# Patient Record
Sex: MALE | Race: BLACK OR AFRICAN AMERICAN | NOT HISPANIC OR LATINO | URBAN - METROPOLITAN AREA
[De-identification: names, ages, dates, MRNs, and addresses within clinical notes are randomized per-mention and may not be internally consistent; named-entity substitution may affect disease eponyms.]

---

## 2023-09-22 PROBLEM — E78.5 HYPERLIPIDEMIA: Status: ACTIVE | Noted: 2023-09-22

## 2023-09-22 PROBLEM — H53.8 HAZY VISION: Status: ACTIVE | Noted: 2023-09-22

## 2023-09-22 PROBLEM — N40.1 BPH ASSOCIATED WITH NOCTURIA: Status: ACTIVE | Noted: 2023-09-22

## 2023-09-22 PROBLEM — I10 HYPERTENSION: Status: ACTIVE | Noted: 2023-09-22

## 2023-09-22 PROBLEM — E11.9 TYPE 2 DIABETES MELLITUS WITHOUT COMPLICATION, WITHOUT LONG-TERM CURRENT USE OF INSULIN (MULTI): Status: ACTIVE | Noted: 2023-09-22

## 2023-09-22 PROBLEM — R35.1 BPH ASSOCIATED WITH NOCTURIA: Status: ACTIVE | Noted: 2023-09-22

## 2023-09-22 PROBLEM — K64.4 EXTERNAL HEMORRHOIDS WITHOUT COMPLICATION: Status: ACTIVE | Noted: 2023-09-22

## 2023-09-22 PROBLEM — K64.9 HEMORRHOIDS: Status: ACTIVE | Noted: 2023-09-22

## 2023-09-22 PROBLEM — T14.8XXA MUSCLE STRAIN: Status: ACTIVE | Noted: 2023-09-22

## 2023-09-22 PROBLEM — N52.9 ERECTILE DYSFUNCTION: Status: ACTIVE | Noted: 2023-09-22

## 2023-09-22 PROBLEM — H10.9 CONJUNCTIVITIS: Status: ACTIVE | Noted: 2023-09-22

## 2023-09-22 PROBLEM — N40.0 PROSTATE ENLARGEMENT: Status: ACTIVE | Noted: 2023-09-22

## 2023-09-22 PROBLEM — M51.26 LUMBAR DISC HERNIATION: Status: ACTIVE | Noted: 2023-09-22

## 2023-09-22 PROBLEM — R30.0 DYSURIA: Status: ACTIVE | Noted: 2023-09-22

## 2023-09-22 PROBLEM — R33.9 URINARY RETENTION: Status: ACTIVE | Noted: 2023-09-22

## 2023-09-22 PROBLEM — N32.89 BLADDER SPASM: Status: ACTIVE | Noted: 2023-09-22

## 2023-09-22 RX ORDER — TAMSULOSIN HYDROCHLORIDE 0.4 MG/1
2 CAPSULE ORAL DAILY
COMMUNITY
Start: 2021-06-14 | End: 2023-11-20 | Stop reason: HOSPADM

## 2023-09-22 RX ORDER — TADALAFIL 20 MG/1
1 TABLET ORAL AS NEEDED
COMMUNITY
Start: 2020-07-31 | End: 2023-10-23

## 2023-09-22 RX ORDER — PHENAZOPYRIDINE HYDROCHLORIDE 200 MG/1
1 TABLET, FILM COATED ORAL
COMMUNITY
End: 2023-10-05

## 2023-09-22 RX ORDER — METFORMIN HYDROCHLORIDE 500 MG/1
1 TABLET ORAL
COMMUNITY
Start: 2020-08-21 | End: 2023-10-23

## 2023-09-22 RX ORDER — OMEPRAZOLE 20 MG/1
1 CAPSULE, DELAYED RELEASE ORAL
COMMUNITY
Start: 2023-08-08 | End: 2023-11-13

## 2023-09-22 RX ORDER — ATORVASTATIN CALCIUM 10 MG/1
1 TABLET, FILM COATED ORAL DAILY
COMMUNITY
Start: 2022-10-04

## 2023-09-22 RX ORDER — LEVOFLOXACIN 250 MG/1
1 TABLET ORAL DAILY
COMMUNITY
End: 2023-11-13

## 2023-10-05 ENCOUNTER — HOSPITAL ENCOUNTER (EMERGENCY)
Facility: HOSPITAL | Age: 63
Discharge: HOME | End: 2023-10-05
Payer: COMMERCIAL

## 2023-10-05 VITALS
TEMPERATURE: 97.7 F | HEART RATE: 75 BPM | DIASTOLIC BLOOD PRESSURE: 83 MMHG | BODY MASS INDEX: 32.83 KG/M2 | RESPIRATION RATE: 16 BRPM | HEIGHT: 75 IN | WEIGHT: 264 LBS | SYSTOLIC BLOOD PRESSURE: 146 MMHG | OXYGEN SATURATION: 98 %

## 2023-10-05 DIAGNOSIS — R30.0 DYSURIA: Primary | ICD-10-CM

## 2023-10-05 LAB
ANION GAP SERPL CALC-SCNC: 10 MMOL/L (ref 10–20)
APPEARANCE UR: CLEAR
BASOPHILS # BLD AUTO: 0.02 X10*3/UL (ref 0–0.1)
BASOPHILS NFR BLD AUTO: 0.3 %
BILIRUB UR STRIP.AUTO-MCNC: NEGATIVE MG/DL
BUN SERPL-MCNC: 11 MG/DL (ref 6–23)
CALCIUM SERPL-MCNC: 9.3 MG/DL (ref 8.6–10.3)
CHLORIDE SERPL-SCNC: 104 MMOL/L (ref 98–107)
CO2 SERPL-SCNC: 25 MMOL/L (ref 21–32)
COLOR UR: NORMAL
CREAT SERPL-MCNC: 1.07 MG/DL (ref 0.5–1.3)
EOSINOPHIL # BLD AUTO: 0.02 X10*3/UL (ref 0–0.7)
EOSINOPHIL NFR BLD AUTO: 0.3 %
ERYTHROCYTE [DISTWIDTH] IN BLOOD BY AUTOMATED COUNT: 12.8 % (ref 11.5–14.5)
GFR SERPL CREATININE-BSD FRML MDRD: 79 ML/MIN/1.73M*2
GLUCOSE SERPL-MCNC: 153 MG/DL (ref 74–99)
GLUCOSE UR STRIP.AUTO-MCNC: NEGATIVE MG/DL
HCT VFR BLD AUTO: 47 % (ref 41–52)
HGB BLD-MCNC: 15.4 G/DL (ref 13.5–17.5)
IMM GRANULOCYTES # BLD AUTO: 0.02 X10*3/UL (ref 0–0.7)
IMM GRANULOCYTES NFR BLD AUTO: 0.3 % (ref 0–0.9)
KETONES UR STRIP.AUTO-MCNC: NEGATIVE MG/DL
LEUKOCYTE ESTERASE UR QL STRIP.AUTO: NEGATIVE
LYMPHOCYTES # BLD AUTO: 1.56 X10*3/UL (ref 1.2–4.8)
LYMPHOCYTES NFR BLD AUTO: 21.8 %
MCH RBC QN AUTO: 28.5 PG (ref 26–34)
MCHC RBC AUTO-ENTMCNC: 32.8 G/DL (ref 32–36)
MCV RBC AUTO: 87 FL (ref 80–100)
MONOCYTES # BLD AUTO: 0.48 X10*3/UL (ref 0.1–1)
MONOCYTES NFR BLD AUTO: 6.7 %
NEUTROPHILS # BLD AUTO: 5.05 X10*3/UL (ref 1.2–7.7)
NEUTROPHILS NFR BLD AUTO: 70.6 %
NITRITE UR QL STRIP.AUTO: NEGATIVE
NRBC BLD-RTO: 0 /100 WBCS (ref 0–0)
PH UR STRIP.AUTO: 6 [PH]
PLATELET # BLD AUTO: 194 X10*3/UL (ref 150–450)
PMV BLD AUTO: 10.8 FL (ref 7.5–11.5)
POTASSIUM SERPL-SCNC: 4.2 MMOL/L (ref 3.5–5.3)
POTASSIUM SERPL-SCNC: 6.1 MMOL/L (ref 3.5–5.3)
PROT UR STRIP.AUTO-MCNC: NEGATIVE MG/DL
RBC # BLD AUTO: 5.41 X10*6/UL (ref 4.5–5.9)
RBC # UR STRIP.AUTO: NEGATIVE /UL
SODIUM SERPL-SCNC: 133 MMOL/L (ref 136–145)
SP GR UR STRIP.AUTO: 1
UROBILINOGEN UR STRIP.AUTO-MCNC: <2 MG/DL
WBC # BLD AUTO: 7.2 X10*3/UL (ref 4.4–11.3)

## 2023-10-05 PROCEDURE — 36415 COLL VENOUS BLD VENIPUNCTURE: CPT | Performed by: PHYSICIAN ASSISTANT

## 2023-10-05 PROCEDURE — 84132 ASSAY OF SERUM POTASSIUM: CPT | Performed by: PHYSICIAN ASSISTANT

## 2023-10-05 PROCEDURE — 85025 COMPLETE CBC W/AUTO DIFF WBC: CPT | Performed by: PHYSICIAN ASSISTANT

## 2023-10-05 PROCEDURE — 84132 ASSAY OF SERUM POTASSIUM: CPT | Mod: 59 | Performed by: PHYSICIAN ASSISTANT

## 2023-10-05 PROCEDURE — 81003 URINALYSIS AUTO W/O SCOPE: CPT | Performed by: PHYSICIAN ASSISTANT

## 2023-10-05 PROCEDURE — 99283 EMERGENCY DEPT VISIT LOW MDM: CPT

## 2023-10-05 PROCEDURE — 99284 EMERGENCY DEPT VISIT MOD MDM: CPT | Performed by: PHYSICIAN ASSISTANT

## 2023-10-05 PROCEDURE — 99284 EMERGENCY DEPT VISIT MOD MDM: CPT

## 2023-10-05 RX ORDER — PHENAZOPYRIDINE HYDROCHLORIDE 200 MG/1
200 TABLET, FILM COATED ORAL 3 TIMES DAILY PRN
Qty: 6 TABLET | Refills: 0 | Status: SHIPPED | OUTPATIENT
Start: 2023-10-05 | End: 2023-10-07

## 2023-10-05 ASSESSMENT — COLUMBIA-SUICIDE SEVERITY RATING SCALE - C-SSRS
6. HAVE YOU EVER DONE ANYTHING, STARTED TO DO ANYTHING, OR PREPARED TO DO ANYTHING TO END YOUR LIFE?: NO
1. IN THE PAST MONTH, HAVE YOU WISHED YOU WERE DEAD OR WISHED YOU COULD GO TO SLEEP AND NOT WAKE UP?: NO
6. HAVE YOU EVER DONE ANYTHING, STARTED TO DO ANYTHING, OR PREPARED TO DO ANYTHING TO END YOUR LIFE?: NO
1. IN THE PAST MONTH, HAVE YOU WISHED YOU WERE DEAD OR WISHED YOU COULD GO TO SLEEP AND NOT WAKE UP?: NO
2. HAVE YOU ACTUALLY HAD ANY THOUGHTS OF KILLING YOURSELF?: NO
2. HAVE YOU ACTUALLY HAD ANY THOUGHTS OF KILLING YOURSELF?: NO

## 2023-10-05 ASSESSMENT — LIFESTYLE VARIABLES
HAVE YOU EVER FELT YOU SHOULD CUT DOWN ON YOUR DRINKING: NO
EVER HAD A DRINK FIRST THING IN THE MORNING TO STEADY YOUR NERVES TO GET RID OF A HANGOVER: NO
HAVE PEOPLE ANNOYED YOU BY CRITICIZING YOUR DRINKING: NO
EVER FELT BAD OR GUILTY ABOUT YOUR DRINKING: NO

## 2023-10-05 ASSESSMENT — PAIN SCALES - GENERAL: PAINLEVEL_OUTOF10: 8

## 2023-10-05 ASSESSMENT — PAIN DESCRIPTION - PAIN TYPE: TYPE: ACUTE PAIN

## 2023-10-05 ASSESSMENT — PAIN DESCRIPTION - FREQUENCY: FREQUENCY: INTERMITTENT

## 2023-10-05 ASSESSMENT — PAIN DESCRIPTION - ONSET: ONSET: ONGOING

## 2023-10-05 ASSESSMENT — PAIN DESCRIPTION - LOCATION: LOCATION: PENIS

## 2023-10-05 ASSESSMENT — PAIN - FUNCTIONAL ASSESSMENT: PAIN_FUNCTIONAL_ASSESSMENT: 0-10

## 2023-10-05 ASSESSMENT — PAIN DESCRIPTION - DESCRIPTORS: DESCRIPTORS: ACHING

## 2023-10-05 NOTE — ED NOTES
Pt ambulated to restroom but urinated on self before he was able to make it to restroom.      Cher Enamorado RN  10/05/23 1809

## 2023-10-05 NOTE — ED NOTES
Pt presents to ED for urinary complications. Pt stated he had a catheter 2-3 months ago for retention. This morning, pt stated when he went to urinate, only a couple drops came out. Pt thinks he is retaining and having bladder spasms. Pt has hx inflamed prostate. Pt denies ABD/Flank pain.      Cher Enamorado RN  10/05/23 8235

## 2023-10-05 NOTE — ED PROVIDER NOTES
"HPI   Chief Complaint   Patient presents with    Urinary Retention     Pt has only been able to empty few drops when urinating, pain when urinating, pt has had to have a catheter in the past       Patient is a 62-year-old male with history of hyperlipidemia, diabetes and enlarged prostate who presents today for evaluation of urinary retention, patient states that for started when he could not urinate in May, he ended up going to the emergency room where he was not able to urinate at all, he had a urinary catheter placed at that time, it has since been removed and patient has not had any problems with urination since May.  He has been following with a urologist, actually has an appointment with Dr. Isi Fulton on the 20th of this month.  He states that when he woke up this morning he went to urinate and states he had difficulty starting a urinary stream, he was eventually able to urinate but states that it came out in spurts and it burned on the way out.  Patient is never had a urinary tract infection but they told him at the urologist office that is a possibility.  He was able to provide a urine sample here today and has urinated once even after I saw him.  He states he does not have any discomfort when he is not urinating but just feels like he urinates in \"spurts\" and states that there is burning with urination.  He denies any abdominal pain or back pain, denies any penile discharge, denies any possibility of STDs.  Denies any history of previous UTIs.  He is feeling better today currently than he did this morning.  He is on Flomax and did take it this morning and takes 2/day as prescribed.                            [place] Coma Scale Score: 15                  Patient History   History reviewed. No pertinent past medical history.  History reviewed. No pertinent surgical history.  No family history on file.  Social History     Tobacco Use    Smoking status: Former     Types: Cigarettes    Smokeless tobacco: Former "   Vaping Use    Vaping Use: Not on file   Substance Use Topics    Alcohol use: Never    Drug use: Never       Physical Exam   ED Triage Vitals [10/05/23 1209]   Temp Heart Rate Resp BP   36.5 °C (97.7 °F) 101 18 (!) 174/92      SpO2 Temp Source Heart Rate Source Patient Position   95 % Temporal Monitor Sitting      BP Location FiO2 (%)     Left arm --       Physical Exam  Vitals and nursing note reviewed.   Constitutional:       General: He is not in acute distress.     Appearance: Normal appearance. He is not toxic-appearing.   HENT:      Head: Normocephalic and atraumatic.      Nose: Nose normal.   Eyes:      Extraocular Movements: Extraocular movements intact.   Cardiovascular:      Rate and Rhythm: Normal rate and regular rhythm.   Pulmonary:      Effort: Pulmonary effort is normal.   Abdominal:      General: There is no distension.      Palpations: Abdomen is soft.      Tenderness: There is no abdominal tenderness. There is no guarding.   Musculoskeletal:         General: Normal range of motion.      Cervical back: Normal range of motion and neck supple.   Skin:     General: Skin is warm and dry.   Neurological:      General: No focal deficit present.      Mental Status: He is alert.   Psychiatric:         Mood and Affect: Mood normal.         Thought Content: Thought content normal.       No orders to display     Labs Reviewed   BASIC METABOLIC PANEL - Abnormal       Result Value    Glucose 153 (*)     Sodium 133 (*)     Potassium 6.1 (*)     Chloride 104      Bicarbonate 25      Anion Gap 10      Urea Nitrogen 11      Creatinine 1.07      eGFR 79      Calcium 9.3     URINALYSIS WITH REFLEX MICROSCOPIC AND CULTURE - Normal    Color, Urine Straw      Appearance, Urine Clear      Specific Gravity, Urine 1.005      pH, Urine 6.0      Protein, Urine NEGATIVE      Glucose, Urine NEGATIVE      Blood, Urine NEGATIVE      Ketones, Urine NEGATIVE      Bilirubin, Urine NEGATIVE      Urobilinogen, Urine <2.0       Nitrite, Urine NEGATIVE      Leukocyte Esterase, Urine NEGATIVE     POTASSIUM - Normal    Potassium 4.2     CBC WITH AUTO DIFFERENTIAL    WBC 7.2      nRBC 0.0      RBC 5.41      Hemoglobin 15.4      Hematocrit 47.0      MCV 87      MCH 28.5      MCHC 32.8      RDW 12.8      Platelets 194      MPV 10.8      Neutrophils % 70.6      Immature Granulocytes %, Automated 0.3      Lymphocytes % 21.8      Monocytes % 6.7      Eosinophils % 0.3      Basophils % 0.3      Neutrophils Absolute 5.05      Immature Granulocytes Absolute, Automated 0.02      Lymphocytes Absolute 1.56      Monocytes Absolute 0.48      Eosinophils Absolute 0.02      Basophils Absolute 0.02     URINALYSIS WITH REFLEX MICROSCOPIC AND CULTURE    Narrative:     The following orders were created for panel order Urinalysis with Reflex Microscopic and Culture.  Procedure                               Abnormality         Status                     ---------                               -----------         ------                     Urinalysis with Reflex M...[[id number]]  Normal              Final result               Extra Urine Gray Tube[[id number]]                            In process                   Please view results for these tests on the individual orders.   EXTRA URINE GRAY TUBE         ED Course & MDM   Diagnoses as of 10/05/23 1812   Dysuria       Medical Decision Making    MDM: Patient is a 62-year-old male who presents today for urinary retention that started this morning however it has since improved, he was able to provide a urine sample and empty his bladder, we did BladderScan him after he urinated, urinalysis, CBC and BMP were obtained to evaluate for renal function or urinary tract infection.  Plan to discharge with follow-up with urology, do not feel that an indwelling catheter is appropriate at this point given that patient is able to empty his bladder and is not actually retaining and the potential risk of infection outweigh any  benefits which I discussed with the patient and he agrees and is agreeable with the plan.  Patient had a completely unremarkable urinalysis, CBC normal, his initial BMP showed a 6.1 potassium, likely hemolyzed, we did redraw this and potassium came back at 4.2.  Patient has gradually been improving while being in the ED, he has had multiple voids while in the ED, bladder scan revealed 288 mL in the bladder.  I discussed with patient, I did offer placing a Red catheter however this would include potential risks of catheter associated UTI and there may be more harm than benefit given that he is actually able to void and does not have full urinary retention.  Patient agrees, would not like a catheter at this time, he does have follow-up with urology in 15 days, advised to call if he can get in any sooner, he is advised to return to the ER with any new or worsening symptoms including total obstruction where he is unable to urinate.  We will give him Pyridium for symptom control in the meantime.        Procedure  Procedures     Susan Sy PA-C  10/05/23 1812

## 2023-10-18 DIAGNOSIS — Z29.9 PROPHYLACTIC MEASURE: ICD-10-CM

## 2023-10-18 RX ORDER — PHENAZOPYRIDINE HYDROCHLORIDE 200 MG/1
200 TABLET, FILM COATED ORAL 3 TIMES DAILY
Qty: 10 TABLET | Refills: 0 | Status: SHIPPED | OUTPATIENT
Start: 2023-10-18 | End: 2023-10-28

## 2023-10-23 ENCOUNTER — PROCEDURE VISIT (OUTPATIENT)
Dept: UROLOGY | Facility: HOSPITAL | Age: 63
End: 2023-10-23
Payer: COMMERCIAL

## 2023-10-23 DIAGNOSIS — R33.9 URINARY RETENTION: Primary | ICD-10-CM

## 2023-10-23 DIAGNOSIS — R35.1 BPH ASSOCIATED WITH NOCTURIA: ICD-10-CM

## 2023-10-23 DIAGNOSIS — N40.1 BPH ASSOCIATED WITH NOCTURIA: ICD-10-CM

## 2023-10-23 LAB
POC APPEARANCE, URINE: CLEAR
POC BILIRUBIN, URINE: NEGATIVE
POC BLOOD, URINE: NEGATIVE
POC COLOR, URINE: YELLOW
POC GLUCOSE, URINE: ABNORMAL MG/DL
POC KETONES, URINE: NEGATIVE MG/DL
POC LEUKOCYTES, URINE: NEGATIVE
POC NITRITE,URINE: POSITIVE
POC PH, URINE: 6.5 PH
POC PROTEIN, URINE: ABNORMAL MG/DL
POC SPECIFIC GRAVITY, URINE: 1.02
POC UROBILINOGEN, URINE: 2 EU/DL

## 2023-10-23 PROCEDURE — 76872 US TRANSRECTAL: CPT | Performed by: UROLOGY

## 2023-10-23 PROCEDURE — 99214 OFFICE O/P EST MOD 30 MIN: CPT | Performed by: UROLOGY

## 2023-10-23 PROCEDURE — 81003 URINALYSIS AUTO W/O SCOPE: CPT | Mod: QW | Performed by: UROLOGY

## 2023-10-23 PROCEDURE — 51741 ELECTRO-UROFLOWMETRY FIRST: CPT | Performed by: UROLOGY

## 2023-10-23 PROCEDURE — 52000 CYSTOURETHROSCOPY: CPT | Performed by: UROLOGY

## 2023-10-23 RX ORDER — CIPROFLOXACIN 500 MG/1
500 TABLET ORAL 2 TIMES DAILY
Qty: 14 TABLET | Refills: 0 | Status: SHIPPED | OUTPATIENT
Start: 2023-10-23 | End: 2023-10-30

## 2023-10-23 ASSESSMENT — PAIN SCALES - GENERAL: PAINLEVEL: 0-NO PAIN

## 2023-10-23 NOTE — PROGRESS NOTES
"FUV    Last seen - 7/3/23     HISTORY OF PRESENT ILLNESS:   Robby Leon is a 62 y.o. male who is being seen today for cysto.trus    Pt with h/o urinary retention.  Been on flomax BID.      PAST MEDICAL HISTORY:  No past medical history on file.    PAST SURGICAL HISTORY:  No past surgical history on file.     ALLERGIES:   No Known Allergies     MEDICATIONS:   Current Outpatient Medications   Medication Instructions    atorvastatin (Lipitor) 10 mg tablet 1 tablet, oral, Daily    levoFLOXacin (Levaquin) 250 mg tablet 1 tablet, oral, Daily    metFORMIN (Glucophage) 500 mg tablet 1 tablet, oral, Daily with evening meal    omeprazole (PriLOSEC) 20 mg DR capsule 1 capsule, oral, Daily before breakfast    phenazopyridine (PYRIDIUM) 200 mg, oral, 3 times daily    tadalafil 20 mg tablet 1 tablet, oral, As needed    tamsulosin (Flomax) 0.4 mg 24 hr capsule 2 capsules, oral, Daily        PHYSICAL EXAM:  There were no vitals taken for this visit.  Constitutional: Patient appears well-developed and well-nourished. No distress.    Pulmonary/Chest: Effort normal. No respiratory distress.   Abdominal: Soft, ND NT  Musculoskeletal: Normal range of motion.    Neurological: Alert and oriented to person, place, and time.  Psychiatric: Normal mood and affect. Behavior is normal. Thought content normal.      Labs  Lab Results   Component Value Date    TESTOSTERONE 479 08/03/2020       Lab Results   Component Value Date    PSA 4.9 (H) 08/03/2020     Lab Results   Component Value Date    CREATININE 1.07 10/05/2023     Lab Results   Component Value Date    CHOL 262 (H) 10/04/2022     Lab Results   Component Value Date    HDL 41 10/04/2022     Lab Results   Component Value Date    CHHDL 6.4 10/04/2022     No results found for: \"LDLF\"  No results found for: \"VLDL\"  Lab Results   Component Value Date    TRIG 178 (H) 10/04/2022     Lab Results   Component Value Date    HGBA1C 6.7 (H) 08/03/2020     Lab Results   Component Value Date    HCT " 47.0 10/05/2023     Procedures  Procedure:  After informed consent was obtained, the patient was taken to the procedure room for cystoscopy due to LUTS    Cystoscopy     Procedure Note:    A sterile prep and drape was performed in standard fashion. Lidocaine was used for topical anesthesia. A flexible cystoscope was inserted into the urethra without difficulty revealing normal urethra.     The prostate trilobar enlargement with intravesicular median lobe    Then entered the bladder revealing bladder mucosa with no erythematous patches or plaques, foreign bodies, stones or papillary lesions. The ureteral orifices were visualized bilaterally. These were orthotopic in location and effluxing clear urine. No masses were seen on retroflexion.     Post-Procedure:   The cystoscope was removed. The vital signs were stable . The patient tolerated the procedure well. There were no complications.      Transrectal US    After informed consent was obtained, the patient was taken to the procedure room for transrectal US on the prostate.    The patient was placed in the left lateral decubitus position with his knees flexed and hips on the edge of the table.  The ultrasound probe was lubricated and inserted into the rectum.    A scanning ultrasound was performed.  There were no suspicious lesions or hypoechoic areas.      Measurements were taken  Width - 55.69 mm  Height - 45.8 mm  Length - 76.1 mm    Calculated Volume  - 101 cc    The ultrasound probe was removed without incident.  Patient tolerated the procedure well.     Complex Uroflow  Max flow: 10.8 ml/s  Avg flow: 4.9 ml/s  Voiding time: 27.4 s  Flow time: 27.3 s  Time to max flow: 4.2 s  Voided volume: 133 ml  Flow pattern: bell    PVR by bladder scan: 1cc      Assessment:      1. Urinary retention  POCT UA Automated manually resulted      2. BPH associated with nocturia          Cystoscopy showed obstructive prostate with intravesicular median lobe.  Prostate volume 101cc.   Qmax 10.8ml/s.  PVR 1cc.     Plan:   -Cont flomax  -Conservative voiding measures  -Will check PSA in few weeks  -Cipro for presumed UTI, will send for Cx  -FU with Dr. Glover to discuss HOLEP

## 2023-11-01 ENCOUNTER — OFFICE VISIT (OUTPATIENT)
Dept: UROLOGY | Facility: HOSPITAL | Age: 63
End: 2023-11-01
Payer: COMMERCIAL

## 2023-11-01 DIAGNOSIS — N40.1 ENLARGED PROSTATE WITH URINARY RETENTION: Primary | ICD-10-CM

## 2023-11-01 DIAGNOSIS — R33.8 ENLARGED PROSTATE WITH URINARY RETENTION: Primary | ICD-10-CM

## 2023-11-01 PROCEDURE — 3079F DIAST BP 80-89 MM HG: CPT | Performed by: UROLOGY

## 2023-11-01 PROCEDURE — 99214 OFFICE O/P EST MOD 30 MIN: CPT | Performed by: UROLOGY

## 2023-11-01 PROCEDURE — 3075F SYST BP GE 130 - 139MM HG: CPT | Performed by: UROLOGY

## 2023-11-01 PROCEDURE — 1036F TOBACCO NON-USER: CPT | Performed by: UROLOGY

## 2023-11-01 NOTE — PROGRESS NOTES
HPI  The patient is a 62 y.o. male presenting on 11/01/2023 with a hx of BPH with urinary retention, referred by Dr. Fulton for an outlet discussion. TRUS 10/23/23 showed obstructive prostate with intravesicular median lobe. Prostate volume 101cc. PVR 1cc.    He is interested in an outlet procedure at this time. Still with a very weak stream.    Lab Results   Component Value Date    PSA 4.9 (H) 08/03/2020       Current Medications:  Current Outpatient Medications   Medication Sig Dispense Refill    atorvastatin (Lipitor) 10 mg tablet Take 1 tablet (10 mg) by mouth once daily.      levoFLOXacin (Levaquin) 250 mg tablet Take 1 tablet (250 mg) by mouth once daily.      omeprazole (PriLOSEC) 20 mg DR capsule Take 1 capsule (20 mg) by mouth once daily in the morning. Take before meals.      tamsulosin (Flomax) 0.4 mg 24 hr capsule Take 2 capsules (0.8 mg) by mouth once daily.       No current facility-administered medications for this visit.        Active Problems:  Robby Leon is a 62 y.o. male with the following Problems and Medications.  Patient Active Problem List   Diagnosis    Conjunctivitis    Dysuria    Erectile dysfunction    External hemorrhoids without complication    Hazy vision    Hemorrhoids    Hyperlipidemia    Hypertension    Lumbar disc herniation    Muscle strain    Prostate enlargement    Type 2 diabetes mellitus without complication, without long-term current use of insulin (CMS/[facility])    Urinary retention    Bladder spasm    BPH associated with nocturia     Current Outpatient Medications   Medication Sig Dispense Refill    atorvastatin (Lipitor) 10 mg tablet Take 1 tablet (10 mg) by mouth once daily.      levoFLOXacin (Levaquin) 250 mg tablet Take 1 tablet (250 mg) by mouth once daily.      omeprazole (PriLOSEC) 20 mg DR capsule Take 1 capsule (20 mg) by mouth once daily in the morning. Take before meals.      tamsulosin (Flomax) 0.4 mg 24 hr capsule Take 2 capsules (0.8 mg) by mouth once daily.        No current facility-administered medications for this visit.       PMH:  No past medical history on file.    PSH:  No past surgical history on file.    FMH:  No family history on file.    SHx:  Social History     Tobacco Use    Smoking status: Former     Types: Cigarettes    Smokeless tobacco: Former   Substance Use Topics    Alcohol use: Never    Drug use: Never       Allergies:  No Known Allergies      Assesment/Plan  We discussed outlet procedures for his large, obstructing prostate. He is very interested in an outlet procedure at this time.    We had a long and thorough discussion regarding the natural history and options for treatment for bothersome prostatic enlargement.  We discussed that observation is an option for minimally symptomatic BPH and the role of medical therapy, but surgical management is recommended for bothersome symptoms despite appropriate medical therapy, when a patient desires to avoid medications, severe or recurrent urinary tract infections, recurrent hematuria attributed to prostatic bleeding, urinary retention, or concern for upper tract damage caused by high pressure voiding and/or incomplete bladder emptying.     We discussed minimally invasive surgical therapies (MIST) including Rezum and UroLift along with their specific indications, risks, and benefits.  Given the size of his prostate, these would not be appropriate for him.  We discussed surgical options including transurethral resection of prostate (TURP), greenlight PVP, HoLEP, open simple prostatectomy, and robotic simple prostatectomy.  Again, given the size of his gland he would benefit from an enucleative approach.  We discussed the relative merits of robotic simple prostatectomy, open simple prostatectomy, and holmium laser enucleation of the prostate.  In particular, given the minimally invasive approach with associated short duration of catheter, low complication rate, and possibility for an outpatient or overnight  stay, I recommended he consider a HoLEP.     We had a long discussion about holmium laser enucleation of the prostate.  I explained how the procedure is done and aundrea diagrams.  I discussed the perioperative pathway, likely 1 night with a catheter and either outpatient or overnight observation in the hospital.  Discussed risks including acute and delayed bleeding, infection, risk of incontinence, risk of anejaculation.  In particular, regarding the risk of temporary incontinence we discussed the literature that reports approximately a third of men at 3 months will have some degree of bothersome leakage, but that number drops to less than 1% at 1 year.  Discussed the low probability of blood transfusion.  I discussed that following the procedure he would not be able to ejaculate, but would still obtain erection and orgasm at his current sexual function. We discussed the possibility of repeat operation, though uncommon with HoLEP.     The patient understands all r/b/a and wishes to proceed with HoLEP on 11/20/23.    Scribe Attestation  By signing my name below, I, Radha Gaines , Scribe   attest that this documentation has been prepared under the direction and in the presence of Omar Glover MD.

## 2023-11-01 NOTE — LETTER
November 1, 2023     Patient: Robby Leon   YOB: 1960   Date of Visit: 11/1/2023       To Whom It May Concern:    It is my medical opinion that Robby Leon should remain out of work until 11/27/2023 due to having surgery on 11/20/2023 .     If you have any questions or concerns, please don't hesitate to call.         Sincerely,        Omar Glover MD    CC: No Recipients

## 2023-11-13 ENCOUNTER — TELEMEDICINE CLINICAL SUPPORT (OUTPATIENT)
Dept: PREADMISSION TESTING | Facility: HOSPITAL | Age: 63
End: 2023-11-13
Payer: COMMERCIAL

## 2023-11-13 RX ORDER — OMEPRAZOLE 20 MG/1
20 CAPSULE, DELAYED RELEASE ORAL
COMMUNITY

## 2023-11-16 ENCOUNTER — LAB (OUTPATIENT)
Dept: LAB | Facility: LAB | Age: 63
End: 2023-11-16
Payer: COMMERCIAL

## 2023-11-16 ENCOUNTER — PRE-ADMISSION TESTING (OUTPATIENT)
Dept: PREADMISSION TESTING | Facility: HOSPITAL | Age: 63
End: 2023-11-16
Payer: COMMERCIAL

## 2023-11-16 VITALS
WEIGHT: 269.84 LBS | RESPIRATION RATE: 18 BRPM | DIASTOLIC BLOOD PRESSURE: 73 MMHG | HEIGHT: 73 IN | SYSTOLIC BLOOD PRESSURE: 123 MMHG | TEMPERATURE: 94.5 F | HEART RATE: 58 BPM | OXYGEN SATURATION: 95 % | BODY MASS INDEX: 35.76 KG/M2

## 2023-11-16 DIAGNOSIS — R33.8 ENLARGED PROSTATE WITH URINARY RETENTION: ICD-10-CM

## 2023-11-16 DIAGNOSIS — I10 PRIMARY HYPERTENSION: ICD-10-CM

## 2023-11-16 DIAGNOSIS — R33.9 URINARY RETENTION: ICD-10-CM

## 2023-11-16 DIAGNOSIS — N40.1 BPH ASSOCIATED WITH NOCTURIA: ICD-10-CM

## 2023-11-16 DIAGNOSIS — I10 PRIMARY HYPERTENSION: Primary | ICD-10-CM

## 2023-11-16 DIAGNOSIS — N40.1 ENLARGED PROSTATE WITH URINARY RETENTION: ICD-10-CM

## 2023-11-16 DIAGNOSIS — R35.1 BPH ASSOCIATED WITH NOCTURIA: ICD-10-CM

## 2023-11-16 LAB
ABO GROUP (TYPE) IN BLOOD: NORMAL
ANION GAP SERPL CALC-SCNC: 12 MMOL/L (ref 10–20)
ANTIBODY SCREEN: NORMAL
APPEARANCE UR: CLEAR
BASOPHILS # BLD AUTO: 0.04 X10*3/UL (ref 0–0.1)
BASOPHILS NFR BLD AUTO: 0.8 %
BILIRUB UR STRIP.AUTO-MCNC: NEGATIVE MG/DL
BUN SERPL-MCNC: 18 MG/DL (ref 6–23)
CALCIUM SERPL-MCNC: 8.9 MG/DL (ref 8.6–10.3)
CHLORIDE SERPL-SCNC: 106 MMOL/L (ref 98–107)
CO2 SERPL-SCNC: 25 MMOL/L (ref 21–32)
COLOR UR: YELLOW
CREAT SERPL-MCNC: 1.15 MG/DL (ref 0.5–1.3)
EOSINOPHIL # BLD AUTO: 0.06 X10*3/UL (ref 0–0.7)
EOSINOPHIL NFR BLD AUTO: 1.2 %
ERYTHROCYTE [DISTWIDTH] IN BLOOD BY AUTOMATED COUNT: 12.8 % (ref 11.5–14.5)
GFR SERPL CREATININE-BSD FRML MDRD: 73 ML/MIN/1.73M*2
GLUCOSE SERPL-MCNC: 124 MG/DL (ref 74–99)
GLUCOSE UR STRIP.AUTO-MCNC: NEGATIVE MG/DL
HCT VFR BLD AUTO: 47.1 % (ref 41–52)
HGB BLD-MCNC: 15.3 G/DL (ref 13.5–17.5)
IMM GRANULOCYTES # BLD AUTO: 0.02 X10*3/UL (ref 0–0.7)
IMM GRANULOCYTES NFR BLD AUTO: 0.4 % (ref 0–0.9)
KETONES UR STRIP.AUTO-MCNC: NEGATIVE MG/DL
LEUKOCYTE ESTERASE UR QL STRIP.AUTO: NEGATIVE
LYMPHOCYTES # BLD AUTO: 1.94 X10*3/UL (ref 1.2–4.8)
LYMPHOCYTES NFR BLD AUTO: 39.4 %
MCH RBC QN AUTO: 28.7 PG (ref 26–34)
MCHC RBC AUTO-ENTMCNC: 32.5 G/DL (ref 32–36)
MCV RBC AUTO: 88 FL (ref 80–100)
MONOCYTES # BLD AUTO: 0.38 X10*3/UL (ref 0.1–1)
MONOCYTES NFR BLD AUTO: 7.7 %
NEUTROPHILS # BLD AUTO: 2.48 X10*3/UL (ref 1.2–7.7)
NEUTROPHILS NFR BLD AUTO: 50.5 %
NITRITE UR QL STRIP.AUTO: NEGATIVE
NRBC BLD-RTO: 0 /100 WBCS (ref 0–0)
PH UR STRIP.AUTO: 5 [PH]
PLATELET # BLD AUTO: 183 X10*3/UL (ref 150–450)
POTASSIUM SERPL-SCNC: 4.3 MMOL/L (ref 3.5–5.3)
PROT UR STRIP.AUTO-MCNC: NEGATIVE MG/DL
RBC # BLD AUTO: 5.33 X10*6/UL (ref 4.5–5.9)
RBC # UR STRIP.AUTO: NEGATIVE /UL
RH FACTOR (ANTIGEN D): NORMAL
SODIUM SERPL-SCNC: 139 MMOL/L (ref 136–145)
SP GR UR STRIP.AUTO: 1.02
UROBILINOGEN UR STRIP.AUTO-MCNC: 2 MG/DL
WBC # BLD AUTO: 4.9 X10*3/UL (ref 4.4–11.3)

## 2023-11-16 PROCEDURE — 84153 ASSAY OF PSA TOTAL: CPT

## 2023-11-16 PROCEDURE — 84154 ASSAY OF PSA FREE: CPT

## 2023-11-16 PROCEDURE — 86901 BLOOD TYPING SEROLOGIC RH(D): CPT

## 2023-11-16 PROCEDURE — 93005 ELECTROCARDIOGRAM TRACING: CPT | Performed by: NURSE PRACTITIONER

## 2023-11-16 PROCEDURE — 81003 URINALYSIS AUTO W/O SCOPE: CPT

## 2023-11-16 PROCEDURE — 86850 RBC ANTIBODY SCREEN: CPT

## 2023-11-16 PROCEDURE — 86900 BLOOD TYPING SEROLOGIC ABO: CPT

## 2023-11-16 PROCEDURE — 99204 OFFICE O/P NEW MOD 45 MIN: CPT | Performed by: NURSE PRACTITIONER

## 2023-11-16 PROCEDURE — 85025 COMPLETE CBC W/AUTO DIFF WBC: CPT

## 2023-11-16 PROCEDURE — 80048 BASIC METABOLIC PNL TOTAL CA: CPT

## 2023-11-16 PROCEDURE — 36415 COLL VENOUS BLD VENIPUNCTURE: CPT

## 2023-11-16 ASSESSMENT — ENCOUNTER SYMPTOMS
CARDIOVASCULAR NEGATIVE: 1
GASTROINTESTINAL NEGATIVE: 1
ENDOCRINE NEGATIVE: 1
MUSCULOSKELETAL NEGATIVE: 1
NECK NEGATIVE: 1
NEUROLOGICAL NEGATIVE: 1
CONSTITUTIONAL NEGATIVE: 1
RESPIRATORY NEGATIVE: 1

## 2023-11-16 NOTE — H&P (VIEW-ONLY)
[facility]/PAT Evaluation       Name: Robby Leon (Robby Leon)  /Age: 1960/62 y.o.     In-Person       Date of Consult: 23    Referring Provider:  Dr. Glover    Surgery, Date, and Length:  23, HOLEP, 120 minutes    Patient presents to [facility] for perioperative risk assessment prior to scheduled surgery. He presents with BPH, urinary retention and TRUS shows obstructive prostate with intravesicular median lobe. He would like to proceed with HOLEP.    This note was created in part upon personal review of patient's medical records.    Pt denies any past history of anesthetic complications such as PONV, awareness, prolonged sedation, dental damage, aspiration, cardiac arrest, difficult intubation, difficult I.V. access or unexpected hospital admissions.  No history of malignant hyperthermia and or pseudocholinesterase deficiency.  No history of blood transfusions.     The patient is not a Mandae[employer] and will accept blood and blood products if medically indicated.     Type and screen sent    Past Medical History:   Diagnosis Date    BPH (benign prostatic hyperplasia)     DM2 (diabetes mellitus, type 2) (CMS/[facility])     2020 a1c 6.7    Hyperlipidemia        Past Surgical History:   Procedure Laterality Date    EXPLORATORY LAPAROTOMY      EYE MUSCLE SURGERY      teenager       Family History   Problem Relation Name Age of Onset    Lung cancer Mother      Prostate cancer Father      Dementia Father      Breast cancer Sister      Prostate cancer Brother       Social History     Tobacco Use   Smoking Status Former    Years: 15    Types: Cigarettes    Quit date:     Years since quittin.8   Smokeless Tobacco Never     Social History     Substance and Sexual Activity   Alcohol Use Not Currently    Comment: 2002 went into treament, no use since     Social History     Substance and Sexual Activity   Drug Use Not Currently    Comment: 2002 went into treament, no use since       No  "Known Allergies      Current Outpatient Medications:     atorvastatin (Lipitor) 10 mg tablet, Take 1 tablet (10 mg) by mouth once daily., Disp: , Rfl:     omeprazole (PriLOSEC) 20 mg DR capsule, Take 1 capsule (20 mg) by mouth. Do not crush or chew., Disp: , Rfl:     tamsulosin (Flomax) 0.4 mg 24 hr capsule, Take 2 capsules (0.8 mg) by mouth once daily., Disp: , Rfl:       PAT ROS:   Constitutional:   neg    Neuro/Psych:   neg    Eyes:    Corrective lenses  Ears:   neg    Nose:   neg    Mouth:   neg    Throat:   neg    Neck:   neg    Cardio:   neg    Respiratory:   neg    Endocrine:   neg    GI:   neg    :   neg    Musculoskeletal:   neg    Hematologic:   neg    Skin:  neg        Physical Exam  Vitals reviewed. Physical exam within normal limits.          PAT AIRWAY:   Airway:     Mallampati::  II    Neck ROM::  Full   No broken teeth, no dentures and no missing teeth        Visit Vitals  /73   Pulse 58   Temp 34.7 °C (94.5 °F)   Resp 18   Ht 1.854 m (6' 1\")   Wt 122 kg (269 lb 13.5 oz)   SpO2 95%   BMI 35.60 kg/m²   Smoking Status Former   BSA 2.51 m²     Assessment and Plan:     Patient is a 62 year old male scheduled for HOLEP with Dr. Glover on 11/20/23.     Patient is at acceptable risk to proceed with planned surgical procedure. Further cardiac risk stratification deferred at this time.This patient is low risk candidate undergoing moderate risk procedure, patient is medically optimized for surgery      Plan    Cardiovascular:    Patient denies any chest pain, tightness, heaviness, pressure, radiating pain, palpitations, irregular heartbeats, lightheadedness, cough, congestion, shortness of breath, JACKSON, PND, near syncope, weight loss or gain.    Good functional capacity    EKG in PAT on  11/16/23:  Sinus bradycardia  Left axis deviation  Abnormal ECG    RCRI: 0 Risk of Mace: 1%    HLD- continue statin    Pulm:  Known or suspected MIRTHA is considered an independent risk factor for difficult mask ventilation, " difficult intubation or both.  Increased vigilance is recommended with the use of narcotics due to an increased risk for opioid induced respiratory depression.  The patient may benefit from continuous pulse oximetry to monitor for hypoxic events until baseline Sp02 is normal on room air.    Stop bang=3, obesity, male, age >50    Heme:  Patient instructed to ambulate as soon as possible postoperatively to decrease thromboembolic risk.    Initiate mechanical DVT prophylaxis as soon as possible and initiate chemical prophylaxis when deemed safe from a bleeding standpoint post surgery.    Risk assessment complete.  Patient is scheduled for a intermediate surgical risk procedure. He is considered medically optimized for the planned procedure.      Labs/testing obtained in PAT on 11/16/23: CBC, BMP, T&S, EKG, URINE CX.    Lab Results   Component Value Date    WBC 4.9 11/16/2023    HGB 15.3 11/16/2023    HCT 47.1 11/16/2023    MCV 88 11/16/2023     11/16/2023     Lab Results   Component Value Date    GLUCOSE 124 (H) 11/16/2023    CALCIUM 8.9 11/16/2023     11/16/2023    K 4.3 11/16/2023    CO2 25 11/16/2023     11/16/2023    BUN 18 11/16/2023    CREATININE 1.15 11/16/2023     Follow up: none    Preoperative medication instructions were provided and reviewed with the patient.  Any additional testing or evaluation was explained to the patient.  Nothing by mouth instructions were discussed and patient's questions were answered prior to conclusion to this encounter.  Patient verbalized understanding of preoperative instructions given in preadmission testing; discharge instructions available in EMR.    This note was dictated with speech recognition.  Minor errors may have been detected during use of speech recognition.

## 2023-11-16 NOTE — PREPROCEDURE INSTRUCTIONS
Medication List            Accurate as of November 16, 2023  8:50 AM. Always use your most recent med list.                atorvastatin 10 mg tablet  Commonly known as: Lipitor  Medication Adjustments for Surgery: Stop 1 day before surgery     omeprazole 20 mg DR capsule  Commonly known as: PriLOSEC  Notes to patient: May take morning of surgery     tamsulosin 0.4 mg 24 hr capsule  Commonly known as: Flomax  Notes to patient: May take morning of surgery            CONTACT SURGEON'S OFFICE IF YOU DEVELOP:  * Fever = 100.4 F   * New respiratory symptoms (e.g. cough, shortness of breath, respiratory distress, sore throat)  * Recent loss of taste or smell  *Flu like symptoms such as headache, fatigue or gastrointestinal symptoms  * You develop any open sores, shingles, burning or painful urination   AND/OR:  * You no longer wish to have the surgery.  * Any other personal circumstances change that may lead to the need to cancel or defer this surgery.  *You were admitted to any hospital within one week of your planned procedure.    SMOKING:  *Quitting smoking can make a huge difference to your health and recovery from surgery.    *If you need help with quitting, call 9-806-QUIT-NOW.    THE DAY BEFORE SURGERY:  *Do not eat any food after midnight the night before surgery.   *You are permitted to drink clear liquids (i.e. water, black coffee, tea, clear broth, apple juice) up to 2 hours before your surgery.  DIABETICS:  Please check fasting blood sugar  upon waking up.  If fasting sugar is <80 mg/dl, please drink 100ml/3oz of apple juice no later than 2 hours prior to surgery.      SURGICAL TIME  *You will be contacted between 2 p.m. and 6 p.m. the business day before your surgery with your arrival time.  *If you haven't received a call by 6pm, call [phone].  *Scheduled surgery times may change and you will be notified if this occurs-check your personal voicemail for any updates.    ON THE MORNING OF SURGERY:  *Wear  comfortable, loose fitting clothing.   *Do not use moisturizers, creams, lotions or perfume.  *All jewelry and valuables should be left at home.  *Prosthetic devices such as contact lenses, hearing aids, dentures, eyelash extensions, hairpins and body piercing must be removed before surgery.    BRING WITH YOU:  *Photo ID and insurance card  *Current list of medicines and allergies  *Pacemaker/Defibrillator/Heart stent cards  *CPAP machine and mask  *Slings/splints/crutches  *Copy of your complete Advanced Directive/DHPOA-if applicable  *Neurostimulator implant remote    PARKING AND ARRIVAL:  *Check in at the Main Entrance desk and let them know you are here for surgery.  *You will be directed to the 2nd floor surgical waiting area.    AFTER OUTPATIENT SURGERY:  *A responsible adult MUST accompany you at the time of discharge and stay with you for 24 hours after your surgery.  *You may NOT drive yourself home after surgery.  *You may use a taxi or ride sharing service ([employer], [employer]) to return home ONLY if you are accompanied by a friend or family member.  *Instructions for resuming your medications will be provided by your surgeon.

## 2023-11-16 NOTE — CPM/PAT H&P
[facility]/PAT Evaluation       Name: Robby Leon (Robby Leon)  /Age: 1960/62 y.o.     In-Person       Date of Consult: 23    Referring Provider:  Dr. Glover    Surgery, Date, and Length:  23, HOLEP, 120 minutes    Patient presents to [facility] for perioperative risk assessment prior to scheduled surgery. He presents with BPH, urinary retention and TRUS shows obstructive prostate with intravesicular median lobe. He would like to proceed with HOLEP.    This note was created in part upon personal review of patient's medical records.    Pt denies any past history of anesthetic complications such as PONV, awareness, prolonged sedation, dental damage, aspiration, cardiac arrest, difficult intubation, difficult I.V. access or unexpected hospital admissions.  No history of malignant hyperthermia and or pseudocholinesterase deficiency.  No history of blood transfusions.     The patient is not a Judaism and will accept blood and blood products if medically indicated.     Type and screen sent    Past Medical History:   Diagnosis Date    BPH (benign prostatic hyperplasia)     DM2 (diabetes mellitus, type 2) (CMS/formerly [facility])     2020 a1c 6.7    Hyperlipidemia        Past Surgical History:   Procedure Laterality Date    EXPLORATORY LAPAROTOMY      EYE MUSCLE SURGERY      teenager       Family History   Problem Relation Name Age of Onset    Lung cancer Mother      Prostate cancer Father      Dementia Father      Breast cancer Sister      Prostate cancer Brother       Social History     Tobacco Use   Smoking Status Former    Years: 15    Types: Cigarettes    Quit date:     Years since quittin.8   Smokeless Tobacco Never     Social History     Substance and Sexual Activity   Alcohol Use Not Currently    Comment: 2002 went into treament, no use since     Social History     Substance and Sexual Activity   Drug Use Not Currently    Comment: 2002 went into treament, no use since       No  "Known Allergies      Current Outpatient Medications:     atorvastatin (Lipitor) 10 mg tablet, Take 1 tablet (10 mg) by mouth once daily., Disp: , Rfl:     omeprazole (PriLOSEC) 20 mg DR capsule, Take 1 capsule (20 mg) by mouth. Do not crush or chew., Disp: , Rfl:     tamsulosin (Flomax) 0.4 mg 24 hr capsule, Take 2 capsules (0.8 mg) by mouth once daily., Disp: , Rfl:       PAT ROS:   Constitutional:   neg    Neuro/Psych:   neg    Eyes:    Corrective lenses  Ears:   neg    Nose:   neg    Mouth:   neg    Throat:   neg    Neck:   neg    Cardio:   neg    Respiratory:   neg    Endocrine:   neg    GI:   neg    :   neg    Musculoskeletal:   neg    Hematologic:   neg    Skin:  neg        Physical Exam  Vitals reviewed. Physical exam within normal limits.          PAT AIRWAY:   Airway:     Mallampati::  II    Neck ROM::  Full   No broken teeth, no dentures and no missing teeth        Visit Vitals  /73   Pulse 58   Temp 34.7 °C (94.5 °F)   Resp 18   Ht 1.854 m (6' 1\")   Wt 122 kg (269 lb 13.5 oz)   SpO2 95%   BMI 35.60 kg/m²   Smoking Status Former   BSA 2.51 m²     Assessment and Plan:     Patient is a 62 year old male scheduled for HOLEP with Dr. Glover on 11/20/23.     Patient is at acceptable risk to proceed with planned surgical procedure. Further cardiac risk stratification deferred at this time.This patient is low risk candidate undergoing moderate risk procedure, patient is medically optimized for surgery      Plan    Cardiovascular:    Patient denies any chest pain, tightness, heaviness, pressure, radiating pain, palpitations, irregular heartbeats, lightheadedness, cough, congestion, shortness of breath, JACKSON, PND, near syncope, weight loss or gain.    Good functional capacity    EKG in PAT on  11/16/23:  Sinus bradycardia  Left axis deviation  Abnormal ECG    RCRI: 0 Risk of Mace: 1%    HLD- continue statin    Pulm:  Known or suspected MIRTHA is considered an independent risk factor for difficult mask ventilation, " difficult intubation or both.  Increased vigilance is recommended with the use of narcotics due to an increased risk for opioid induced respiratory depression.  The patient may benefit from continuous pulse oximetry to monitor for hypoxic events until baseline Sp02 is normal on room air.    Stop bang=3, obesity, male, age >50    Heme:  Patient instructed to ambulate as soon as possible postoperatively to decrease thromboembolic risk.    Initiate mechanical DVT prophylaxis as soon as possible and initiate chemical prophylaxis when deemed safe from a bleeding standpoint post surgery.    Risk assessment complete.  Patient is scheduled for a intermediate surgical risk procedure. He is considered medically optimized for the planned procedure.      Labs/testing obtained in PAT on 11/16/23: CBC, BMP, T&S, EKG, URINE CX.    Lab Results   Component Value Date    WBC 4.9 11/16/2023    HGB 15.3 11/16/2023    HCT 47.1 11/16/2023    MCV 88 11/16/2023     11/16/2023     Lab Results   Component Value Date    GLUCOSE 124 (H) 11/16/2023    CALCIUM 8.9 11/16/2023     11/16/2023    K 4.3 11/16/2023    CO2 25 11/16/2023     11/16/2023    BUN 18 11/16/2023    CREATININE 1.15 11/16/2023     Follow up: none    Preoperative medication instructions were provided and reviewed with the patient.  Any additional testing or evaluation was explained to the patient.  Nothing by mouth instructions were discussed and patient's questions were answered prior to conclusion to this encounter.  Patient verbalized understanding of preoperative instructions given in preadmission testing; discharge instructions available in EMR.    This note was dictated with speech recognition.  Minor errors may have been detected during use of speech recognition.

## 2023-11-17 LAB
ATRIAL RATE: 59 BPM
P AXIS: 66 DEGREES
P OFFSET: 192 MS
P ONSET: 131 MS
PR INTERVAL: 158 MS
Q ONSET: 210 MS
QRS COUNT: 10 BEATS
QRS DURATION: 86 MS
QT INTERVAL: 420 MS
QTC CALCULATION(BAZETT): 415 MS
QTC FREDERICIA: 417 MS
R AXIS: -33 DEGREES
T AXIS: 24 DEGREES
T OFFSET: 420 MS
VENTRICULAR RATE: 59 BPM

## 2023-11-19 LAB
PSA FREE MFR SERPL: 19 %
PSA FREE SERPL-MCNC: 1 NG/ML
PSA SERPL IA-MCNC: 5.4 NG/ML (ref 0–4)

## 2023-11-20 ENCOUNTER — ANESTHESIA EVENT (OUTPATIENT)
Dept: OPERATING ROOM | Facility: HOSPITAL | Age: 63
End: 2023-11-20
Payer: COMMERCIAL

## 2023-11-20 ENCOUNTER — ANESTHESIA (OUTPATIENT)
Dept: OPERATING ROOM | Facility: HOSPITAL | Age: 63
End: 2023-11-20
Payer: COMMERCIAL

## 2023-11-20 ENCOUNTER — HOSPITAL ENCOUNTER (OUTPATIENT)
Facility: HOSPITAL | Age: 63
Setting detail: OUTPATIENT SURGERY
Discharge: HOME | End: 2023-11-20
Attending: UROLOGY | Admitting: UROLOGY
Payer: COMMERCIAL

## 2023-11-20 VITALS
DIASTOLIC BLOOD PRESSURE: 82 MMHG | TEMPERATURE: 99.1 F | HEART RATE: 72 BPM | SYSTOLIC BLOOD PRESSURE: 162 MMHG | BODY MASS INDEX: 36.08 KG/M2 | HEIGHT: 73 IN | OXYGEN SATURATION: 94 % | RESPIRATION RATE: 13 BRPM | WEIGHT: 272.27 LBS

## 2023-11-20 DIAGNOSIS — R33.8 ENLARGED PROSTATE WITH URINARY RETENTION: ICD-10-CM

## 2023-11-20 DIAGNOSIS — N40.1 BPH ASSOCIATED WITH NOCTURIA: Primary | ICD-10-CM

## 2023-11-20 DIAGNOSIS — R35.1 BPH ASSOCIATED WITH NOCTURIA: Primary | ICD-10-CM

## 2023-11-20 DIAGNOSIS — N40.1 ENLARGED PROSTATE WITH URINARY RETENTION: ICD-10-CM

## 2023-11-20 LAB
ABO GROUP (TYPE) IN BLOOD: NORMAL
RH FACTOR (ANTIGEN D): NORMAL

## 2023-11-20 PROCEDURE — 3600000009 HC OR TIME - EACH INCREMENTAL 1 MINUTE - PROCEDURE LEVEL FOUR: Performed by: UROLOGY

## 2023-11-20 PROCEDURE — A4217 STERILE WATER/SALINE, 500 ML: HCPCS | Performed by: UROLOGY

## 2023-11-20 PROCEDURE — 2500000005 HC RX 250 GENERAL PHARMACY W/O HCPCS: Performed by: NURSE ANESTHETIST, CERTIFIED REGISTERED

## 2023-11-20 PROCEDURE — 36415 COLL VENOUS BLD VENIPUNCTURE: CPT | Performed by: UROLOGY

## 2023-11-20 PROCEDURE — 3600000004 HC OR TIME - INITIAL BASE CHARGE - PROCEDURE LEVEL FOUR: Performed by: UROLOGY

## 2023-11-20 PROCEDURE — 2500000004 HC RX 250 GENERAL PHARMACY W/ HCPCS (ALT 636 FOR OP/ED): Performed by: STUDENT IN AN ORGANIZED HEALTH CARE EDUCATION/TRAINING PROGRAM

## 2023-11-20 PROCEDURE — 88307 TISSUE EXAM BY PATHOLOGIST: CPT | Mod: TC | Performed by: UROLOGY

## 2023-11-20 PROCEDURE — 52649 PROSTATE LASER ENUCLEATION: CPT | Performed by: UROLOGY

## 2023-11-20 PROCEDURE — 51700 IRRIGATION OF BLADDER: CPT

## 2023-11-20 PROCEDURE — A52648 PR LASER VAPORIZATION SURGERY PROSTATE, COMPLETE: Performed by: NURSE ANESTHETIST, CERTIFIED REGISTERED

## 2023-11-20 PROCEDURE — 3700000002 HC GENERAL ANESTHESIA TIME - EACH INCREMENTAL 1 MINUTE: Performed by: UROLOGY

## 2023-11-20 PROCEDURE — 7100000009 HC PHASE TWO TIME - INITIAL BASE CHARGE: Performed by: UROLOGY

## 2023-11-20 PROCEDURE — 88307 TISSUE EXAM BY PATHOLOGIST: CPT | Mod: TC,SUR | Performed by: UROLOGY

## 2023-11-20 PROCEDURE — 7100000010 HC PHASE TWO TIME - EACH INCREMENTAL 1 MINUTE: Performed by: UROLOGY

## 2023-11-20 PROCEDURE — 2500000004 HC RX 250 GENERAL PHARMACY W/ HCPCS (ALT 636 FOR OP/ED): Performed by: NURSE ANESTHETIST, CERTIFIED REGISTERED

## 2023-11-20 PROCEDURE — 7100000002 HC RECOVERY ROOM TIME - EACH INCREMENTAL 1 MINUTE: Performed by: UROLOGY

## 2023-11-20 PROCEDURE — 2720000007 HC OR 272 NO HCPCS: Performed by: UROLOGY

## 2023-11-20 PROCEDURE — 3700000001 HC GENERAL ANESTHESIA TIME - INITIAL BASE CHARGE: Performed by: UROLOGY

## 2023-11-20 PROCEDURE — 88307 TISSUE EXAM BY PATHOLOGIST: CPT | Performed by: PATHOLOGY

## 2023-11-20 PROCEDURE — 2500000004 HC RX 250 GENERAL PHARMACY W/ HCPCS (ALT 636 FOR OP/ED): Performed by: UROLOGY

## 2023-11-20 PROCEDURE — 7100000001 HC RECOVERY ROOM TIME - INITIAL BASE CHARGE: Performed by: UROLOGY

## 2023-11-20 PROCEDURE — A52648 PR LASER VAPORIZATION SURGERY PROSTATE, COMPLETE: Performed by: STUDENT IN AN ORGANIZED HEALTH CARE EDUCATION/TRAINING PROGRAM

## 2023-11-20 RX ORDER — FENTANYL CITRATE 50 UG/ML
INJECTION, SOLUTION INTRAMUSCULAR; INTRAVENOUS AS NEEDED
Status: DISCONTINUED | OUTPATIENT
Start: 2023-11-20 | End: 2023-11-20

## 2023-11-20 RX ORDER — SULFAMETHOXAZOLE AND TRIMETHOPRIM 800; 160 MG/1; MG/1
1 TABLET ORAL 2 TIMES DAILY
Qty: 6 TABLET | Refills: 0 | Status: SHIPPED | OUTPATIENT
Start: 2023-11-20 | End: 2023-11-23

## 2023-11-20 RX ORDER — PHENAZOPYRIDINE HYDROCHLORIDE 100 MG/1
100 TABLET, FILM COATED ORAL 3 TIMES DAILY PRN
Qty: 15 TABLET | Refills: 0 | Status: SHIPPED | OUTPATIENT
Start: 2023-11-20 | End: 2024-05-31 | Stop reason: ALTCHOICE

## 2023-11-20 RX ORDER — LIDOCAINE HYDROCHLORIDE 10 MG/ML
0.1 INJECTION, SOLUTION EPIDURAL; INFILTRATION; INTRACAUDAL; PERINEURAL ONCE
Status: DISCONTINUED | OUTPATIENT
Start: 2023-11-20 | End: 2023-11-20 | Stop reason: HOSPADM

## 2023-11-20 RX ORDER — DIPHENHYDRAMINE HYDROCHLORIDE 50 MG/ML
12.5 INJECTION INTRAMUSCULAR; INTRAVENOUS ONCE AS NEEDED
Status: DISCONTINUED | OUTPATIENT
Start: 2023-11-20 | End: 2023-11-20 | Stop reason: HOSPADM

## 2023-11-20 RX ORDER — SODIUM CHLORIDE, SODIUM LACTATE, POTASSIUM CHLORIDE, CALCIUM CHLORIDE 600; 310; 30; 20 MG/100ML; MG/100ML; MG/100ML; MG/100ML
100 INJECTION, SOLUTION INTRAVENOUS CONTINUOUS
Status: DISCONTINUED | OUTPATIENT
Start: 2023-11-20 | End: 2023-11-20 | Stop reason: HOSPADM

## 2023-11-20 RX ORDER — SODIUM CHLORIDE, SODIUM LACTATE, POTASSIUM CHLORIDE, CALCIUM CHLORIDE 600; 310; 30; 20 MG/100ML; MG/100ML; MG/100ML; MG/100ML
100 INJECTION, SOLUTION INTRAVENOUS CONTINUOUS
Status: CANCELLED | OUTPATIENT
Start: 2023-11-20

## 2023-11-20 RX ORDER — LIDOCAINE HYDROCHLORIDE 20 MG/ML
INJECTION, SOLUTION INFILTRATION; PERINEURAL AS NEEDED
Status: DISCONTINUED | OUTPATIENT
Start: 2023-11-20 | End: 2023-11-20

## 2023-11-20 RX ORDER — ONDANSETRON HYDROCHLORIDE 2 MG/ML
4 INJECTION, SOLUTION INTRAVENOUS ONCE AS NEEDED
Status: DISCONTINUED | OUTPATIENT
Start: 2023-11-20 | End: 2023-11-20 | Stop reason: HOSPADM

## 2023-11-20 RX ORDER — SODIUM CHLORIDE 0.9 G/100ML
IRRIGANT IRRIGATION AS NEEDED
Status: DISCONTINUED | OUTPATIENT
Start: 2023-11-20 | End: 2023-11-20 | Stop reason: HOSPADM

## 2023-11-20 RX ORDER — LABETALOL HYDROCHLORIDE 5 MG/ML
5 INJECTION, SOLUTION INTRAVENOUS ONCE AS NEEDED
Status: COMPLETED | OUTPATIENT
Start: 2023-11-20 | End: 2023-11-20

## 2023-11-20 RX ORDER — MIDAZOLAM HYDROCHLORIDE 1 MG/ML
INJECTION, SOLUTION INTRAMUSCULAR; INTRAVENOUS AS NEEDED
Status: DISCONTINUED | OUTPATIENT
Start: 2023-11-20 | End: 2023-11-20

## 2023-11-20 RX ORDER — FUROSEMIDE 10 MG/ML
INJECTION INTRAMUSCULAR; INTRAVENOUS AS NEEDED
Status: DISCONTINUED | OUTPATIENT
Start: 2023-11-20 | End: 2023-11-20

## 2023-11-20 RX ORDER — ONDANSETRON HYDROCHLORIDE 2 MG/ML
INJECTION, SOLUTION INTRAVENOUS AS NEEDED
Status: DISCONTINUED | OUTPATIENT
Start: 2023-11-20 | End: 2023-11-20

## 2023-11-20 RX ORDER — OXYCODONE HYDROCHLORIDE 5 MG/1
5 TABLET ORAL EVERY 4 HOURS PRN
Status: DISCONTINUED | OUTPATIENT
Start: 2023-11-20 | End: 2023-11-20 | Stop reason: HOSPADM

## 2023-11-20 RX ORDER — PROPOFOL 10 MG/ML
INJECTION, EMULSION INTRAVENOUS AS NEEDED
Status: DISCONTINUED | OUTPATIENT
Start: 2023-11-20 | End: 2023-11-20

## 2023-11-20 RX ORDER — PHENYLEPHRINE HCL IN 0.9% NACL 1 MG/10 ML
SYRINGE (ML) INTRAVENOUS AS NEEDED
Status: DISCONTINUED | OUTPATIENT
Start: 2023-11-20 | End: 2023-11-20

## 2023-11-20 RX ORDER — DEXAMETHASONE SODIUM PHOSPHATE 4 MG/ML
INJECTION, SOLUTION INTRA-ARTICULAR; INTRALESIONAL; INTRAMUSCULAR; INTRAVENOUS; SOFT TISSUE AS NEEDED
Status: DISCONTINUED | OUTPATIENT
Start: 2023-11-20 | End: 2023-11-20

## 2023-11-20 RX ORDER — OXYCODONE HYDROCHLORIDE 5 MG/1
5 TABLET ORAL EVERY 6 HOURS PRN
Qty: 4 TABLET | Refills: 0 | Status: SHIPPED | OUTPATIENT
Start: 2023-11-20 | End: 2023-11-23

## 2023-11-20 RX ORDER — ROCURONIUM BROMIDE 10 MG/ML
INJECTION, SOLUTION INTRAVENOUS AS NEEDED
Status: DISCONTINUED | OUTPATIENT
Start: 2023-11-20 | End: 2023-11-20

## 2023-11-20 RX ORDER — CEFAZOLIN 1 G/1
INJECTION, POWDER, FOR SOLUTION INTRAVENOUS AS NEEDED
Status: DISCONTINUED | OUTPATIENT
Start: 2023-11-20 | End: 2023-11-20

## 2023-11-20 RX ORDER — DOCUSATE SODIUM 100 MG/1
100 CAPSULE, LIQUID FILLED ORAL 2 TIMES DAILY
Qty: 30 CAPSULE | Refills: 0 | Status: SHIPPED | OUTPATIENT
Start: 2023-11-20 | End: 2024-05-31 | Stop reason: ALTCHOICE

## 2023-11-20 RX ADMIN — FUROSEMIDE 10 MG: 10 INJECTION, SOLUTION INTRAMUSCULAR; INTRAVENOUS at 09:05

## 2023-11-20 RX ADMIN — SUGAMMADEX 200 MG: 100 INJECTION, SOLUTION INTRAVENOUS at 09:10

## 2023-11-20 RX ADMIN — LIDOCAINE HYDROCHLORIDE 60 MG: 20 INJECTION, SOLUTION INFILTRATION; PERINEURAL at 07:49

## 2023-11-20 RX ADMIN — PROPOFOL 200 MG: 10 INJECTION, EMULSION INTRAVENOUS at 07:49

## 2023-11-20 RX ADMIN — MIDAZOLAM HYDROCHLORIDE 2 MG: 1 INJECTION, SOLUTION INTRAMUSCULAR; INTRAVENOUS at 07:35

## 2023-11-20 RX ADMIN — FENTANYL CITRATE 100 MCG: 50 INJECTION, SOLUTION INTRAMUSCULAR; INTRAVENOUS at 07:49

## 2023-11-20 RX ADMIN — SODIUM CHLORIDE, SODIUM LACTATE, POTASSIUM CHLORIDE, AND CALCIUM CHLORIDE: 600; 310; 30; 20 INJECTION, SOLUTION INTRAVENOUS at 07:33

## 2023-11-20 RX ADMIN — DEXAMETHASONE SODIUM PHOSPHATE 8 MG: 4 INJECTION, SOLUTION INTRAMUSCULAR; INTRAVENOUS at 07:46

## 2023-11-20 RX ADMIN — CEFAZOLIN 2 G: 1 INJECTION, POWDER, FOR SOLUTION INTRAMUSCULAR; INTRAVENOUS at 07:50

## 2023-11-20 RX ADMIN — Medication 200 MCG: at 08:43

## 2023-11-20 RX ADMIN — LABETALOL HYDROCHLORIDE 5 MG: 5 INJECTION INTRAVENOUS at 09:55

## 2023-11-20 RX ADMIN — ONDANSETRON 4 MG: 2 INJECTION INTRAMUSCULAR; INTRAVENOUS at 07:46

## 2023-11-20 RX ADMIN — ROCURONIUM BROMIDE 70 MG: 10 INJECTION, SOLUTION INTRAVENOUS at 07:49

## 2023-11-20 ASSESSMENT — PAIN SCALES - GENERAL
PAINLEVEL_OUTOF10: 4
PAINLEVEL_OUTOF10: 0 - NO PAIN
PAINLEVEL_OUTOF10: 4
PAINLEVEL_OUTOF10: 0 - NO PAIN

## 2023-11-20 ASSESSMENT — PAIN - FUNCTIONAL ASSESSMENT
PAIN_FUNCTIONAL_ASSESSMENT: 0-10
PAIN_FUNCTIONAL_ASSESSMENT: VAS (VISUAL ANALOG SCALE)
PAIN_FUNCTIONAL_ASSESSMENT: 0-10

## 2023-11-20 ASSESSMENT — COLUMBIA-SUICIDE SEVERITY RATING SCALE - C-SSRS
2. HAVE YOU ACTUALLY HAD ANY THOUGHTS OF KILLING YOURSELF?: NO
1. IN THE PAST MONTH, HAVE YOU WISHED YOU WERE DEAD OR WISHED YOU COULD GO TO SLEEP AND NOT WAKE UP?: NO
6. HAVE YOU EVER DONE ANYTHING, STARTED TO DO ANYTHING, OR PREPARED TO DO ANYTHING TO END YOUR LIFE?: NO

## 2023-11-20 NOTE — ANESTHESIA PROCEDURE NOTES
Airway  Date/Time: 11/20/2023 7:48 AM  Urgency: elective    Airway not difficult    Staffing  Performed: CRNA   Authorized by: Az Jackson MD    Performed by: DANIELLE Wood-JAQUELIN, DNP  Patient location during procedure: OR    Indications and Patient Condition  Indications for airway management: anesthesia and airway protection  Spontaneous ventilation: present  Sedation level: deep  Preoxygenated: yes  Patient position: sniffing  MILS maintained throughout  Mask difficulty assessment: 1 - vent by mask  No planned trial extubation    Final Airway Details  Final airway type: endotracheal airway      Successful airway: ETT - double lumen left  Cuffed: yes   Successful intubation technique: direct laryngoscopy  Facilitating devices/methods: intubating stylet  Blade: Karol  Blade size: #3  Cormack-Lehane Classification: grade I - full view of glottis  Placement verified by: chest auscultation and capnometry   Measured from: lips  ETT to lips (cm): 23  Number of attempts at approach: 1  Ventilation between attempts: none  Number of other approaches attempted: 0

## 2023-11-20 NOTE — ANESTHESIA POSTPROCEDURE EVALUATION
Patient: Robby Leon    Procedure Summary       Date: 11/20/23 Room / Location: [facility] OR 06 / [facility] OR    Anesthesia Start: 0733 Anesthesia Stop: 0937    Procedure: Prostate Holmium Laser Transurethral Enucleation Diagnosis:       Enlarged prostate with urinary retention      (Enlarged prostate with urinary retention [N40.1, R33.8])    Surgeons: Omar Glover MD Responsible Provider: Az Jackson MD    Anesthesia Type: general ASA Status: 2            Anesthesia Type: general    Vitals Value Taken Time   /78 11/20/23 1030   Temp 37.3 °C (99.1 °F) 11/20/23 0930   Pulse 74 11/20/23 1030   Resp 12 11/20/23 1030   SpO2 93 % 11/20/23 1030       Anesthesia Post Evaluation    Patient location during evaluation: bedside  Patient participation: complete - patient participated  Level of consciousness: awake  Pain management: adequate  Multimodal analgesia pain management approach  Airway patency: patent  Cardiovascular status: stable  Respiratory status: spontaneous ventilation and unassisted  Hydration status: acceptable  Postoperative Nausea and Vomiting: none  Comments: No significant PONV.        There were no known notable events for this encounter.

## 2023-11-20 NOTE — DISCHARGE INSTRUCTIONS
DEPARTMENT OF UROLOGY  DISCHARGE INSTRUCTIONS -- Holmium Laser   Outpatient Surgery    C O N F I D E N T I A L   I N F O R M A T I O N    Robby Leon        Call [phone] during regular daytime business hours (8:00 am - 5:00 pm) and after 5:00 pm and ask for the Urology resident with any questions or concerns.      If it is a life-threatening situation, proceed to the nearest emergency department.        Follow-up appointment:  trial of void, as per schedule     Thank you for the opportunity to care for you today.  Your health and healing are very important to us.  We hope we made you feel as comfortable as possible and are committed to your recovery and continued well-being.      The following is a brief overview of your transurethral prostate resection today. Some of the information contained on this summary may be confidential.  This information should be kept in your records and should be shared with your regular doctor.    Physicians:   Dr. Glover      Procedure performed: Prostate Resection  Pending results:   pathology of tissue taken from your prostate    What to Expect During your Recovery and Home Care  Anesthesia Side Effects   You received general anesthesia today.  You may feel sleepy, tired, or have a sore throat.   You may also feel drowsiness, dizziness, or inability to think clearly.  For your safety, do not drive, drink alcoholic beverages, take any unprescribed medication or make any important decisions for 24 hours.  A responsible adult should be with you for 24 hours.        Activity and Recovery    No heavy lifting over ten pounds x2 weeks. Limit activity while urinary catheter is in place. Avoid activities that would cause pulling or tugging on your catheter.    Do not drive or operate heavy machinery while taking narcotic pain medications as these medications can alter perception, impair judgement, and slow reaction times.    Pain Control  Unfortunately, you may experience pain after  your procedure.  Adequate management can include alternative measures to help ease your pain and can include over the counter Tylenol or Advil can be taken as prescribed as needed for breakthrough pain. Do not take more than 4,000mg of Tylenol in a 24-hour period.      You may also experience bladder spasms due to the catheter.     Nausea/Vomiting   Clear liquids are best tolerated at first. Start slow, advance your diet as tolerated to normal foods. Avoid spicy, greasy, heavy foods at first. Also, you may feel nauseous or like you need to vomit if you take any type of medication on an empty stomach.  Call your physician if you are unable to eat or drink and have persistent vomiting.    Signs of Bleeding   You are going to have some blood in your urine. Your urine will be light pink to yellow. You always want to look at the urine in the tubing of your catheter and not in the large urine collection bag to check for bleeding. If urine becomes thick dark red, has large clots or stops draining, please notify your physician.    Treatment/wound care:   Keep area(s) clean and dry. Clean around the tip or your penis were the catheter goes in daily with mild soap and water.  It is okay to shower 24 hours after time of surgery.    Do not submerge your catheter in standing water until seen for follow up appointment (no tub bathing, swimming, or hot tubs).      Signs of Infection  Signs of infection can include fever, drainage(green/yellow), chills, burning sensation with passing of urine, catheter leakage, or severe abdominal pain.  If you see any of these occur, please contact your doctor's office at [phone].  Any fever higher than 100.4, especially if associated with an ill feeling, abdominal pain, chills, or nausea should be reported to your surgeon.      Assist in bowel movements/urination  Increase fiber in diet  Increase water (6 to 8 glasses)  Increase walking     Additional Instructions: CATHETER CARE  Always keep  the catheters tubing and drainage bag below the level of your bladder.  Avoid loops and kinks in the catheter tubing.  NOTIFY your physician if catheter falls out or catheter seems clogged and urine is not draining.   Do not wear the small leg bag to bed you should be provided with a larger overnight bag that you should wear to bed and can hang over the side of the bed.  We recommend wearing the large bag in the shower, as this is easy to dry, and you do not get your leg straps wet from your leg bag.   Your catheter should be secured to your upper thigh, do not allow it to hang or dangle.  Your catheter will be removed at your post-operative appointment.

## 2023-11-20 NOTE — ANESTHESIA PREPROCEDURE EVALUATION
Patient: Robby Leon    Procedure Information       Date/Time: 11/20/23 0730    Procedure: Prostate Holmium Laser Transurethral Enucleation    Location: [facility] OR 06 / [facility]    Surgeons: Omar Glover MD            Relevant Problems   Cardiovascular   (+) Hyperlipidemia   (+) Hypertension      Endocrine   (+) Type 2 diabetes mellitus without complication, without long-term current use of insulin (CMS/HCC)      Musculoskeletal   (+) Lumbar disc herniation       Clinical information reviewed:    Allergies  Meds               NPO Detail:  NPO/Void Status  Date of Last Liquid: 11/20/23  Time of Last Liquid: 0400  Date of Last Solid: 11/19/23  Time of Last Solid: 2000         Physical Exam    Airway  Mallampati: II  TM distance: >3 FB  Neck ROM: full     Cardiovascular   Rhythm: regular  Rate: normal     Dental    Pulmonary   Breath sounds clear to auscultation     Abdominal            Anesthesia Plan    ASA 2     general     intravenous induction   Anesthetic plan and risks discussed with patient.    Plan discussed with CRNA.    Marisela Kwon APRN-CRNA, DNP  \

## 2023-11-20 NOTE — OP NOTE
Prostate Holmium Laser Transurethral Enucleation Operative Note     Date: 2023  OR Location: [facility] OR    Name: Robby Leon, : 1960, Age: 62 y.o., MRN: [id number], Sex: male    Diagnosis  Pre-op Diagnosis     * Enlarged prostate with urinary retention [N40.1, R33.8] Post-op Diagnosis     * Enlarged prostate with urinary retention [N40.1, R33.8]     Procedures  Prostate Holmium Laser Transurethral Enucleation  00041 - WA LASER ENUCLEATION PROSTATE W/MORCELLATION      Surgeons      * Omar Glover - Primary    Resident/Fellow/Other Assistant:  Surgeon(s) and Role:    Procedure Summary  Anesthesia: * No anesthesia type entered *  ASA: II  Anesthesia Staff: Anesthesiologist: Az Jackson MD  CRNA: DANIELLE Wood-JAQUELIN, TIM  Estimated Blood Loss: 30mL  Intra-op Medications: * No intraprocedure medications in log *           Anesthesia Record               Intraprocedure I/O Totals          Intake    Propofol Drip 0.00 mL    The total shown is the total volume documented since Anesthesia Start was filed.    Total Intake 0 mL       Output    Urine 0 mL    Est. Blood Loss 30 mL    NG/OG Tube Output 50 mL    Other 0 mL    Total Output 80 mL       Net    Net Volume -80 mL          Specimen:   ID Type Source Tests Collected by Time   1 :  Tissue  SURGICAL PATHOLOGY EXAM Omar Glover MD 2023 0927        Staff:   Circulator: Sindi Nicole RN  Relief Circulator: Francy GOLDBERG RN  Scrub Person: Yuli Ramirez         Drains and/or Catheters: * None in log *    Tourniquet Times:         Implants:     Findings: massive trilobar enlargement of the prostate. Median lobe growing into trigone. Uos close but preserved    Indications: Robby Leon is an 62 y.o. male who is having surgery for Enlarged prostate with urinary retention [N40.1, R33.8].     The patient was seen in the preoperative area. The risks, benefits, complications, treatment options, non-operative alternatives, expected  recovery and outcomes were discussed with the patient. The possibilities of reaction to medication, pulmonary aspiration, injury to surrounding structures, bleeding, recurrent infection, the need for additional procedures, failure to diagnose a condition, and creating a complication requiring transfusion or operation were discussed with the patient. The patient concurred with the proposed plan, giving informed consent.  The site of surgery was properly noted/marked if necessary per policy. The patient has been actively warmed in preoperative area. Preoperative antibiotics have been ordered and given within 1 hours of incision. Venous thrombosis prophylaxis have been ordered including bilateral sequential compression devices    Procedure Details:   1. HOLMIUM LASER ENUCLEATION OF THE PROSTATE      Laser Settings Used:  Cutting 2 J, 40 Hz.  Coagulation 1.5 J, 30 Hz.   Philippe or Virtual basket used? Philippe  Preoperative Prostate Size:  approx 120 cubic centimeters.     Prostate configuration: trilobar  Complication: none  EBL: 30 ml  Catheter: 22Fr 3 way  Antibiotics: ancef  Specimen: Morcellated prostatic tissue.    DESCRIPTION OF PROCEDURE:      The patient was brought tto he OR and after good general anesthesia was achieved, the patient was prepped and draped in dorsal lithotomy position. All pressure points were padded.  Surgical pause was performed.  The patient was identified using 2 identifiers.  The correct surgical procedure was confirmed.  All members of surgical and anesthesia team were in agreement.  The patient received prophylactic antibiotic and the procedure started.    Cystoscopy: The patient's bladder was first entered with a 22-Welsh rigid cystoscope with 30-degree lens.  Cystoscopic examination showed normal anterior urethra.  The posterior urethra showed trilobar enlargement of the prostate with a large intravesical median lobe.  Both ureteral orifices were identified away from the bladder neck. The  cystoscope was removed and the meatus was dilated up to 28-Citizen of [place] using sounds.  The urethra was then filled with lidocaine gel and a 26-Citizen of [place] Salas continuous-flow resectoscope was placed.  The holmium laser apparatus was placed through the sheath.  Using a 550-micron end-firing quartz laser fiber, a laser bridge, and a 7-Citizen of [place] laser stabilizing catheter, the procedure was started with the above-mentioned laser setting.    A en bloc technique was performed with an initial incision at the apex and circumferentially using low energy.  We continued to develop our anterior plane at the apex, identifying the capsule and freeing up the anterior apex well within the sphincter. We then proceeded with our posterior dissection, developing the posterior space toward the bladder neck and continuing our incision laterally to 9 and 3:00.    Then we turned our attention to the lateral attachments of the prostate.  Using laser energy, taking care to avoid any damage to the sphincter, we connected our previously dissected anterior and posterior planes to completely liberate the apex of the prostate preserving the sphincter. We then continued our dissection circumferentially, freeing the prostate systematically from apex to its attachments at the bladder neck. The adenoma was then pushed into the bladder.     Once the prostate had been fully enucleated, the laser was defocused on any sources of bleeding to get additional hemostasis.  There was good hemostasis at the conclusion of this procedure.  A few small remaining nodular adenomas were then resected from the capsule.      The laser bridge apparatus and the resectoscope were then removed and the offset Salas nephroscope and Salas - Armando tissue morcellator were then introduced and used to completely morcellate the tissue.  Two inflows were used during this portion of the procedure to fully distend the bladder and to prevent any inadvertent bladder injury.  The bladder was then  hebert'paris out after insertion of the inner sheath and reinspected with the cystoscope showing no residual adenomas.  Hemostasis was ensured at the conclusion of the procedure and both ureteral orifices were confirmed and identified well away from the area of resection.  No residual adenoma could be identified.     Following that, a three-way Red catheter on a guide was placed into the bladder and the balloon was filled.  The bladder was irrigated near clear and the continuous irrigation was started. 10mg IV lasix given    The patient tolerated the procedure well and was shifted to recovery in good general condition    Complications:  None; patient tolerated the procedure well.    Disposition: PACU - hemodynamically stable.  Condition: stable         Additional Details:     Attending Attestation: I was present and scrubbed for the entire procedure.    Omar Glover  Phone Number: [phone]

## 2023-11-20 NOTE — POST-PROCEDURE NOTE
1120 pt wife at bedside and discharge instruction reviewed by nurse  1140 pt wife help pt to get dressed  1145IV removed

## 2023-11-21 NOTE — PROGRESS NOTES
Subjective   Patient ID: Robby Leon is a 62 y.o. male presents for TOV s/p HoLEP with Dr. Glover 11/20/2023    HPI  History of BPH with LUTS. TRUS 10/23/23 showed obstructive prostate with intravesicular median lobe. Prostate volume 101cc.  He has been doing well since surgery. Urine has become clear, yellow without evidence of gross hematuria or clots. He denies any fevers or chills.    Review of Systems  All other systems have been reviewed and are negative for complaint.    Objective   Physical Exam  General: Well developed, well nourished, alert and cooperative, appears in no acute distress  Eyes: Non-injected conjunctiva, sclera clear, no proptosis  Cardiac: Extremities are warm and well perfused. No edema, cyanosis or pallor.   Lungs: Breathing is easy, non-labored. Speaking in clear and complete sentences. Normal diaphragmatic movement.  MSK: Ambulatory with steady gait, unassisted  Neuro: alert and oriented to person, place and time  Psych: Demonstrates good judgement and reason, without hallucinations, abnormal affect or abnormal behaviors.  Skin: no obvious lesions, no rashes.    Assessment/Plan   Diagnoses and all orders for this visit:  Enlarged prostate with urinary retention      We discussed postoperative urinary retention in great detail. We discussed that different medications, including anesthesia can influence urinary retention. We discussed that postoperative constipation can also contribute to urinary retention. We discussed management of urinary retention to include indwelling catheter or CIC. We discussed risks of unmanaged urinary retention including irreversible kidney injury and increased risk of UTI. We discussed TOV today.    [] TOV today passed   [] Drink plenty of fluids to maintain hydration and clear urine output  [] You may have some irritative voiding symptoms over the next few days: burning, frequency, urgency  [] Activity restrictions reviewed    He will return to clinic as  previously scheduled for post-operative visit with Dr. Glover, or sooner if needed.    All questions and concerns were addressed. Patient verbalizes understanding and has no other questions at this time.     You are able to have email access to your chart. You can sign into [employer]. or add the Follow My Health fe on your smart phone to review today's visit, laboratory work and imaging.     Genie Whittaker-- JACINTO SANTOS  Office Phone:  [phone]

## 2023-11-22 ENCOUNTER — OFFICE VISIT (OUTPATIENT)
Dept: UROLOGY | Facility: HOSPITAL | Age: 63
End: 2023-11-22
Payer: COMMERCIAL

## 2023-11-22 DIAGNOSIS — R33.8 ENLARGED PROSTATE WITH URINARY RETENTION: Primary | ICD-10-CM

## 2023-11-22 DIAGNOSIS — N40.1 ENLARGED PROSTATE WITH URINARY RETENTION: Primary | ICD-10-CM

## 2023-11-22 PROCEDURE — 51700 IRRIGATION OF BLADDER: CPT | Performed by: NURSE PRACTITIONER

## 2023-11-22 PROCEDURE — 1036F TOBACCO NON-USER: CPT | Performed by: NURSE PRACTITIONER

## 2023-11-22 PROCEDURE — 99024 POSTOP FOLLOW-UP VISIT: CPT | Performed by: NURSE PRACTITIONER

## 2023-11-29 LAB
LABORATORY COMMENT REPORT: NORMAL
PATH REPORT.FINAL DX SPEC: NORMAL
PATH REPORT.GROSS SPEC: NORMAL
PATH REPORT.RELEVANT HX SPEC: NORMAL
PATH REPORT.TOTAL CANCER: NORMAL

## 2023-12-06 ENCOUNTER — OFFICE VISIT (OUTPATIENT)
Dept: UROLOGY | Facility: HOSPITAL | Age: 63
End: 2023-12-06
Payer: COMMERCIAL

## 2023-12-06 DIAGNOSIS — R33.9 URINARY RETENTION: ICD-10-CM

## 2023-12-06 DIAGNOSIS — R97.20 ELEVATED PSA: ICD-10-CM

## 2023-12-06 DIAGNOSIS — R30.0 DYSURIA: Primary | ICD-10-CM

## 2023-12-06 PROCEDURE — 1036F TOBACCO NON-USER: CPT | Performed by: UROLOGY

## 2023-12-06 PROCEDURE — 99024 POSTOP FOLLOW-UP VISIT: CPT | Performed by: UROLOGY

## 2023-12-06 NOTE — PROGRESS NOTES
HPI    63M with history of urinary retention, 100cc prostate, seen preop with very weak stream but emptying adequately.    Now s/p outpatient HoLEP 11/20/23, passed TOV 11/22/23.    Path - 61g benign    12/6/23 - PVR 7ml. No leakage. Stream strong. Some mild burning, nothing severe. Very happy with his outcome at this point.        Lab Results   Component Value Date    PSA 5.4 (H) 11/16/2023    PSA 4.9 (H) 08/03/2020                     Current Medications:  Current Outpatient Medications   Medication Sig Dispense Refill    atorvastatin (Lipitor) 10 mg tablet Take 1 tablet (10 mg) by mouth once daily.      docusate sodium (Colace) 100 mg capsule Take 1 capsule (100 mg) by mouth 2 times a day. Hold for loose stools 30 capsule 0    omeprazole (PriLOSEC) 20 mg DR capsule Take 1 capsule (20 mg) by mouth. Do not crush or chew.      phenazopyridine (Pyridium) 100 mg tablet Take 1 tablet (100 mg) by mouth 3 times a day as needed (burning). 15 tablet 0     No current facility-administered medications for this visit.        Active Problems:  Robby Leon is a 63 y.o. male with the following Problems and Medications.  Patient Active Problem List   Diagnosis    Conjunctivitis    Dysuria    Erectile dysfunction    External hemorrhoids without complication    Hazy vision    Hemorrhoids    Hyperlipidemia    Hypertension    Lumbar disc herniation    Muscle strain    Prostate enlargement    Type 2 diabetes mellitus without complication, without long-term current use of insulin (CMS/[facility])    Urinary retention    Bladder spasm    BPH associated with nocturia    Enlarged prostate with urinary retention     Current Outpatient Medications   Medication Sig Dispense Refill    atorvastatin (Lipitor) 10 mg tablet Take 1 tablet (10 mg) by mouth once daily.      docusate sodium (Colace) 100 mg capsule Take 1 capsule (100 mg) by mouth 2 times a day. Hold for loose stools 30 capsule 0    omeprazole (PriLOSEC) 20 mg DR capsule Take 1 capsule  (20 mg) by mouth. Do not crush or chew.      phenazopyridine (Pyridium) 100 mg tablet Take 1 tablet (100 mg) by mouth 3 times a day as needed (burning). 15 tablet 0     No current facility-administered medications for this visit.       PMH:  Past Medical History:   Diagnosis Date    BPH (benign prostatic hyperplasia)     DM2 (diabetes mellitus, type 2) (CMS/[facility])     2020 a1c 6.7    Hyperlipidemia        PSH:  Past Surgical History:   Procedure Laterality Date    EXPLORATORY LAPAROTOMY      EYE MUSCLE SURGERY      teenager       FMH:  Family History   Problem Relation Name Age of Onset    Lung cancer Mother      Prostate cancer Father      Dementia Father      Breast cancer Sister      Prostate cancer Brother         SHx:  Social History     Tobacco Use    Smoking status: Former     Years: 15     Types: Cigarettes     Quit date:      Years since quittin.9    Smokeless tobacco: Never   Vaping Use    Vaping Use: Never used   Substance Use Topics    Alcohol use: Not Currently     Comment: 2002 went into treament, no use since    Drug use: Not Currently     Comment: 2002 went into treament, no use since       Allergies:  No Known Allergies      Assesment/Plan  2 weeks out, doing very well - strong stream, dry, emptying well.     Fu 3 mo with PSA to establish a new baseline, PVR.      Scribe Attestation  By signing my name below, I, Radha Gaines , Sunil   attest that this documentation has been prepared under the direction and in the presence of Omar Glover MD.

## 2024-03-05 NOTE — PROGRESS NOTES
HPI  63M with history of urinary retention, 100cc prostate, seen preop with very weak stream but emptying adequately.    Now s/p outpatient HoLEP 11/20/23, passed TOV 11/22/23.    Path - 61g benign    12/6/23 - PVR 7ml. No leakage. Stream strong. Some mild burning, nothing severe. Very happy with his outcome at this point.    3/6/24 - seen for 3 mo pvr and PSA. PSA not completed. Will obtain now. PVR 67cc. No urinary symptoms, very pleased with this. Has continued trouble with erections. Has tried and failed Viagra and Cialis.    Lab Results   Component Value Date    PSA 5.4 (H) 11/16/2023    PSA 4.9 (H) 08/03/2020         Current Medications:  Current Outpatient Medications   Medication Sig Dispense Refill    atorvastatin (Lipitor) 10 mg tablet Take 1 tablet (10 mg) by mouth once daily.      docusate sodium (Colace) 100 mg capsule Take 1 capsule (100 mg) by mouth 2 times a day. Hold for loose stools 30 capsule 0    omeprazole (PriLOSEC) 20 mg DR capsule Take 1 capsule (20 mg) by mouth. Do not crush or chew.      phenazopyridine (Pyridium) 100 mg tablet Take 1 tablet (100 mg) by mouth 3 times a day as needed (burning). 15 tablet 0     No current facility-administered medications for this visit.        Active Problems:  Robby Leon is a 63 y.o. male with the following Problems and Medications.  Patient Active Problem List   Diagnosis    Conjunctivitis    Dysuria    Erectile dysfunction    External hemorrhoids without complication    Hazy vision    Hemorrhoids    Hyperlipidemia    Hypertension    Lumbar disc herniation    Muscle strain    Prostate enlargement    Type 2 diabetes mellitus without complication, without long-term current use of insulin (CMS/[facility])    Urinary retention    Bladder spasm    BPH associated with nocturia    Enlarged prostate with urinary retention     Current Outpatient Medications   Medication Sig Dispense Refill    atorvastatin (Lipitor) 10 mg tablet Take 1 tablet (10 mg) by mouth once  daily.      docusate sodium (Colace) 100 mg capsule Take 1 capsule (100 mg) by mouth 2 times a day. Hold for loose stools 30 capsule 0    omeprazole (PriLOSEC) 20 mg DR capsule Take 1 capsule (20 mg) by mouth. Do not crush or chew.      phenazopyridine (Pyridium) 100 mg tablet Take 1 tablet (100 mg) by mouth 3 times a day as needed (burning). 15 tablet 0     No current facility-administered medications for this visit.       PMH:  Past Medical History:   Diagnosis Date    BPH (benign prostatic hyperplasia)     DM2 (diabetes mellitus, type 2) (CMS/[facility])     2020 a1c 6.7    Hyperlipidemia        PSH:  Past Surgical History:   Procedure Laterality Date    EXPLORATORY LAPAROTOMY      EYE MUSCLE SURGERY      teenager       FMH:  Family History   Problem Relation Name Age of Onset    Lung cancer Mother      Prostate cancer Father      Dementia Father      Breast cancer Sister      Prostate cancer Brother         SHx:  Social History     Tobacco Use    Smoking status: Former     Years: 15     Types: Cigarettes     Quit date:      Years since quittin.1    Smokeless tobacco: Never   Vaping Use    Vaping Use: Never used   Substance Use Topics    Alcohol use: Not Currently     Comment: 2002 went into treament, no use since    Drug use: Not Currently     Comment: 2002 went into treament, no use since       Allergies:  No Known Allergies      Assesment/Plan  Patient will obtain PSA now to establish a new baseline since surgery. He has no urinary symptoms at this time, he is thrilled with his results. We discussed his ED. He has tried and failed PDE5i's. We discussed the next steps being vacuum assist erection devices, injection therapy, and penile implant.  We discussed their attendant risks and benefits. For now, patient would like to further discuss a penile implant. He will see Dr. Fulton for a consult at this time. We will follow up in 3mo via [facility] to discuss his PSA results.      Scribe Attestation  By  signing my name below, I, Radha Gaines Scribsuzanna   attest that this documentation has been prepared under the direction and in the presence of Omar Glover MD.

## 2024-03-06 ENCOUNTER — OFFICE VISIT (OUTPATIENT)
Dept: UROLOGY | Facility: HOSPITAL | Age: 64
End: 2024-03-06
Payer: COMMERCIAL

## 2024-03-06 ENCOUNTER — LAB (OUTPATIENT)
Dept: LAB | Facility: LAB | Age: 64
End: 2024-03-06
Payer: COMMERCIAL

## 2024-03-06 DIAGNOSIS — R97.20 ELEVATED PSA: ICD-10-CM

## 2024-03-06 DIAGNOSIS — N40.1 ENLARGED PROSTATE WITH URINARY RETENTION: ICD-10-CM

## 2024-03-06 DIAGNOSIS — R33.8 ENLARGED PROSTATE WITH URINARY RETENTION: ICD-10-CM

## 2024-03-06 DIAGNOSIS — N52.9 ERECTILE DYSFUNCTION, UNSPECIFIED ERECTILE DYSFUNCTION TYPE: Primary | ICD-10-CM

## 2024-03-06 LAB — PSA SERPL-MCNC: 2.79 NG/ML

## 2024-03-06 PROCEDURE — 1036F TOBACCO NON-USER: CPT | Performed by: UROLOGY

## 2024-03-06 PROCEDURE — 84153 ASSAY OF PSA TOTAL: CPT

## 2024-03-06 PROCEDURE — 99214 OFFICE O/P EST MOD 30 MIN: CPT | Performed by: UROLOGY

## 2024-03-06 PROCEDURE — 99213 OFFICE O/P EST LOW 20 MIN: CPT | Performed by: UROLOGY

## 2024-03-06 PROCEDURE — 36415 COLL VENOUS BLD VENIPUNCTURE: CPT

## 2024-03-14 ENCOUNTER — HOSPITAL ENCOUNTER (OUTPATIENT)
Facility: HOSPITAL | Age: 64
Setting detail: OUTPATIENT SURGERY
End: 2024-03-14
Attending: UROLOGY | Admitting: UROLOGY
Payer: COMMERCIAL

## 2024-03-14 ENCOUNTER — OFFICE VISIT (OUTPATIENT)
Dept: UROLOGY | Facility: HOSPITAL | Age: 64
End: 2024-03-14
Payer: COMMERCIAL

## 2024-03-14 DIAGNOSIS — N52.9 ERECTILE DYSFUNCTION, UNSPECIFIED ERECTILE DYSFUNCTION TYPE: ICD-10-CM

## 2024-03-14 DIAGNOSIS — E11.9 TYPE 2 DIABETES MELLITUS WITHOUT COMPLICATION, WITHOUT LONG-TERM CURRENT USE OF INSULIN (MULTI): Primary | ICD-10-CM

## 2024-03-14 PROCEDURE — 99215 OFFICE O/P EST HI 40 MIN: CPT | Performed by: UROLOGY

## 2024-03-14 PROCEDURE — 1036F TOBACCO NON-USER: CPT | Performed by: UROLOGY

## 2024-03-14 RX ORDER — FLUCONAZOLE 2 MG/ML
200 INJECTION, SOLUTION INTRAVENOUS ONCE
Status: CANCELLED | OUTPATIENT
Start: 2024-03-14 | End: 2024-03-14

## 2024-03-14 RX ORDER — ACETAMINOPHEN 325 MG/1
650 TABLET ORAL ONCE
Status: CANCELLED | OUTPATIENT
Start: 2024-03-14 | End: 2024-03-14

## 2024-03-14 RX ORDER — SODIUM CHLORIDE, SODIUM LACTATE, POTASSIUM CHLORIDE, CALCIUM CHLORIDE 600; 310; 30; 20 MG/100ML; MG/100ML; MG/100ML; MG/100ML
100 INJECTION, SOLUTION INTRAVENOUS CONTINUOUS
Status: CANCELLED | OUTPATIENT
Start: 2024-03-14

## 2024-03-14 RX ORDER — GABAPENTIN 600 MG/1
600 TABLET ORAL ONCE
Status: CANCELLED | OUTPATIENT
Start: 2024-03-14 | End: 2024-03-14

## 2024-03-14 RX ORDER — CHLORHEXIDINE GLUCONATE 40 MG/ML
SOLUTION TOPICAL DAILY PRN
Status: CANCELLED | OUTPATIENT
Start: 2024-03-14

## 2024-03-14 RX ORDER — VANCOMYCIN HYDROCHLORIDE 1 G/200ML
1000 INJECTION, SOLUTION INTRAVENOUS ONCE
Status: CANCELLED | OUTPATIENT
Start: 2024-03-14 | End: 2024-03-14

## 2024-03-14 ASSESSMENT — PAIN SCALES - GENERAL: PAINLEVEL: 0-NO PAIN

## 2024-03-14 NOTE — PROGRESS NOTES
NPV - Referred by Dr. Glover     HISTORY OF PRESENT ILLNESS:   Robby Leon is a 63 y.o. male who is being seen today for ED    s/p outpatient HoLEP 11/20/23 with Dr. Glover. Voiding great    ED - Failed [id number], not able to do self injections        No prior hernia surgery    PAST MEDICAL HISTORY:  Past Medical History:   Diagnosis Date    BPH (benign prostatic hyperplasia)     DM2 (diabetes mellitus, type 2) (CMS/[facility])     8/30/2020 a1c 6.7    Hyperlipidemia        PAST SURGICAL HISTORY:  Past Surgical History:   Procedure Laterality Date    EXPLORATORY LAPAROTOMY  1988    EYE MUSCLE SURGERY      teenager        ALLERGIES:   No Known Allergies     MEDICATIONS:   Current Outpatient Medications   Medication Instructions    atorvastatin (Lipitor) 10 mg tablet 1 tablet, oral, Daily    docusate sodium (COLACE) 100 mg, oral, 2 times daily, Hold for loose stools    omeprazole (PRILOSEC) 20 mg, oral, Do not crush or chew.    phenazopyridine (PYRIDIUM) 100 mg, oral, 3 times daily PRN        PHYSICAL EXAM:  There were no vitals taken for this visit.  Constitutional: Patient appears well-developed and well-nourished. No distress.    Pulmonary/Chest: Effort normal. No respiratory distress.   Abdominal: Soft, ND NT  :Circumcised phallus, no lesions or plaque  Musculoskeletal: Normal range of motion.    Neurological: Alert and oriented to person, place, and time.  Psychiatric: Normal mood and affect. Behavior is normal. Thought content normal.      Labs  Lab Results   Component Value Date    TESTOSTERONE 479 08/03/2020     Lab Results   Component Value Date    PSA 2.79 03/06/2024     Lab Results   Component Value Date    CREATININE 1.15 11/16/2023     Lab Results   Component Value Date    CHOL 262 (H) 10/04/2022     Lab Results   Component Value Date    HDL 41 10/04/2022     Lab Results   Component Value Date    CHHDL 6.4 10/04/2022     Lab Results   Component Value Date    TRIG 178 (H) 10/04/2022     Lab Results    Component Value Date    HGBA1C 6.7 (H) 08/03/2020     Lab Results   Component Value Date    HCT 47.1 11/16/2023       Imaging    Procedures      Assessment:      1. Type 2 diabetes mellitus without complication, without long-term current use of insulin (CMS/[facility])        2. Erectile dysfunction, unspecified erectile dysfunction type          Robby Leon is a 63 y.o. male here for FUV     Plan:   Erectile Dysfunction  Patient was seen today with the complaint of erectile dysfunction (ED).  I went over the definition of ED, which is the inability to obtain or maintain an erection sufficient for satisfactory sexual activity.   I outlined that erectile function is a complex interplay of neural, vascular, hormonal and psychological factors.  Disruption in any of these pathways may lead to ED.    We discussed the role of Penile Doppler.  It involves an injection of a vasodilator (Trimix) to induce an erection, then using an ultrasound probe we assess the blood flow to the penis, how well the penis traps the blood (venous leak), as well as the presence of plaques, calcifications, or curvature of the penis. He is to consider this option.    In terms of treatment options; PDE5i (Viagra, Cialis, etc.), intracavernosal injections (ICI), vacuum erection devices (KENNEDY) and penile implants were discussed in detail.      He would like to proceed with penile implant    Penile Prosthesis  The patient has erectile dysfunction refractory to conservative management. Etiology of his erectile dysfunction is organic.  Patient has failed PDE5i.      We discussed that further treatment would require penile prosthesis surgery.  We discussed that this artificial device would be placed into the penis and disrupt the tissue that creates natural erections so he can never go back to another kind of treatment for erections.  His penis may look and feel different, even with a completely deflated prosthesis.  We discussed that this procedure  will not make his penis longer, and in fact he may look smaller then his previous natural erections.  We may have the patient use the vacuum erectile device to increase blood flow to his penis, if he is amenable.      We discussed the differences between the malleable (semirigid) and inflatable (hydraulic) types of penile prosthesis.  We discussed that the inflatable prosthesis would also include a pump in the scrotum and a reservoir that would be placed in the pelvis or abdominal wall.  We discussed that sometimes placement of the reservoir requires a second incision.  Models were used to show the prosthesis and patient was able to work with them.    Risks of the surgery were discussed, which include but are not limited to pain, bleeding, erosion of the prosthesis, mechanical failure requiring surgical replacement, urinary retention, infection of the prosthesis requiring surgical removal and/or replacement, and damage to surrounding structures including the penis, urethra, bladder, and pelvic structures.      After all of the patient's questions were satisfactorily answered, he expressed understanding of the risks of surgery and wishes to proceed.  No barriers to learning were identified.    Patient ill be scheduled in a timely fashion.  He will see pre-admission testing and obtain any clearance if necessary.      Plan for coloplast Titan IP

## 2024-03-26 ENCOUNTER — APPOINTMENT (OUTPATIENT)
Dept: UROLOGY | Facility: HOSPITAL | Age: 64
End: 2024-03-26
Payer: COMMERCIAL

## 2024-04-03 ENCOUNTER — TELEMEDICINE CLINICAL SUPPORT (OUTPATIENT)
Dept: PREADMISSION TESTING | Facility: HOSPITAL | Age: 64
End: 2024-04-03
Payer: COMMERCIAL

## 2024-04-03 ENCOUNTER — TELEPHONE (OUTPATIENT)
Dept: PREADMISSION TESTING | Facility: HOSPITAL | Age: 64
End: 2024-04-03
Payer: COMMERCIAL

## 2024-04-03 DIAGNOSIS — E11.9 TYPE 2 DIABETES MELLITUS WITHOUT COMPLICATION, WITHOUT LONG-TERM CURRENT USE OF INSULIN (MULTI): ICD-10-CM

## 2024-04-03 DIAGNOSIS — N52.9 ERECTILE DYSFUNCTION, UNSPECIFIED ERECTILE DYSFUNCTION TYPE: ICD-10-CM

## 2024-04-10 ENCOUNTER — APPOINTMENT (OUTPATIENT)
Dept: PREADMISSION TESTING | Facility: HOSPITAL | Age: 64
End: 2024-04-10
Payer: COMMERCIAL

## 2024-05-07 ENCOUNTER — APPOINTMENT (OUTPATIENT)
Dept: UROLOGY | Facility: HOSPITAL | Age: 64
End: 2024-05-07
Payer: COMMERCIAL

## 2024-05-17 ENCOUNTER — PREP FOR PROCEDURE (OUTPATIENT)
Dept: UROLOGY | Facility: HOSPITAL | Age: 64
End: 2024-05-17
Payer: COMMERCIAL

## 2024-05-17 ENCOUNTER — HOSPITAL ENCOUNTER (OUTPATIENT)
Facility: HOSPITAL | Age: 64
Setting detail: OUTPATIENT SURGERY
End: 2024-05-17
Attending: UROLOGY | Admitting: UROLOGY
Payer: COMMERCIAL

## 2024-05-17 DIAGNOSIS — N52.9 ERECTILE DYSFUNCTION, UNSPECIFIED ERECTILE DYSFUNCTION TYPE: Primary | ICD-10-CM

## 2024-05-17 RX ORDER — SODIUM CHLORIDE, SODIUM LACTATE, POTASSIUM CHLORIDE, CALCIUM CHLORIDE 600; 310; 30; 20 MG/100ML; MG/100ML; MG/100ML; MG/100ML
100 INJECTION, SOLUTION INTRAVENOUS CONTINUOUS
OUTPATIENT
Start: 2024-05-17

## 2024-05-17 RX ORDER — GABAPENTIN 600 MG/1
600 TABLET ORAL ONCE
OUTPATIENT
Start: 2024-05-17 | End: 2024-05-17

## 2024-05-17 RX ORDER — FLUCONAZOLE 2 MG/ML
200 INJECTION, SOLUTION INTRAVENOUS ONCE
OUTPATIENT
Start: 2024-05-17 | End: 2024-05-17

## 2024-05-17 RX ORDER — VANCOMYCIN HYDROCHLORIDE 1 G/200ML
1000 INJECTION, SOLUTION INTRAVENOUS ONCE
OUTPATIENT
Start: 2024-05-17 | End: 2024-05-17

## 2024-05-17 RX ORDER — CHLORHEXIDINE GLUCONATE 40 MG/ML
SOLUTION TOPICAL DAILY PRN
OUTPATIENT
Start: 2024-05-17

## 2024-05-17 RX ORDER — ACETAMINOPHEN 325 MG/1
650 TABLET ORAL ONCE
OUTPATIENT
Start: 2024-05-17 | End: 2024-05-17

## 2024-05-31 ENCOUNTER — CLINICAL SUPPORT (OUTPATIENT)
Dept: PREADMISSION TESTING | Facility: HOSPITAL | Age: 64
End: 2024-05-31
Payer: COMMERCIAL

## 2024-05-31 DIAGNOSIS — N52.9 ERECTILE DYSFUNCTION, UNSPECIFIED ERECTILE DYSFUNCTION TYPE: ICD-10-CM

## 2024-06-29 ENCOUNTER — APPOINTMENT (OUTPATIENT)
Dept: RADIOLOGY | Facility: HOSPITAL | Age: 64
End: 2024-06-29
Payer: COMMERCIAL

## 2024-06-29 ENCOUNTER — HOSPITAL ENCOUNTER (EMERGENCY)
Facility: HOSPITAL | Age: 64
Discharge: HOME | End: 2024-06-29
Payer: COMMERCIAL

## 2024-06-29 VITALS
HEIGHT: 75 IN | DIASTOLIC BLOOD PRESSURE: 79 MMHG | TEMPERATURE: 98.2 F | BODY MASS INDEX: 32.7 KG/M2 | WEIGHT: 263 LBS | SYSTOLIC BLOOD PRESSURE: 157 MMHG | OXYGEN SATURATION: 99 % | HEART RATE: 87 BPM | RESPIRATION RATE: 16 BRPM

## 2024-06-29 DIAGNOSIS — S92.351A CLOSED FRACTURE OF BASE OF FIFTH METATARSAL BONE OF RIGHT FOOT: Primary | ICD-10-CM

## 2024-06-29 PROCEDURE — 99283 EMERGENCY DEPT VISIT LOW MDM: CPT

## 2024-06-29 PROCEDURE — 99284 EMERGENCY DEPT VISIT MOD MDM: CPT | Mod: 25

## 2024-06-29 PROCEDURE — 73630 X-RAY EXAM OF FOOT: CPT | Mod: LT

## 2024-06-29 PROCEDURE — 73610 X-RAY EXAM OF ANKLE: CPT | Mod: LT

## 2024-06-29 PROCEDURE — 2500000001 HC RX 250 WO HCPCS SELF ADMINISTERED DRUGS (ALT 637 FOR MEDICARE OP): Performed by: NURSE PRACTITIONER

## 2024-06-29 PROCEDURE — 73610 X-RAY EXAM OF ANKLE: CPT | Mod: LEFT SIDE | Performed by: RADIOLOGY

## 2024-06-29 PROCEDURE — 73630 X-RAY EXAM OF FOOT: CPT | Mod: LEFT SIDE | Performed by: RADIOLOGY

## 2024-06-29 RX ORDER — CYCLOBENZAPRINE HCL 5 MG
5 TABLET ORAL ONCE
Status: COMPLETED | OUTPATIENT
Start: 2024-06-29 | End: 2024-06-29

## 2024-06-29 RX ORDER — NAPROXEN 500 MG/1
500 TABLET ORAL ONCE
Status: COMPLETED | OUTPATIENT
Start: 2024-06-29 | End: 2024-06-29

## 2024-06-29 RX ORDER — NAPROXEN 500 MG/1
500 TABLET ORAL
Qty: 20 TABLET | Refills: 0 | Status: SHIPPED | OUTPATIENT
Start: 2024-06-29 | End: 2024-07-09

## 2024-06-29 RX ORDER — TIZANIDINE 2 MG/1
4 TABLET ORAL 3 TIMES DAILY PRN
Qty: 180 TABLET | Refills: 0 | Status: SHIPPED | OUTPATIENT
Start: 2024-06-29 | End: 2024-07-29

## 2024-06-29 RX ADMIN — NAPROXEN 500 MG: 500 TABLET ORAL at 10:34

## 2024-06-29 RX ADMIN — CYCLOBENZAPRINE HYDROCHLORIDE 5 MG: 5 TABLET, FILM COATED ORAL at 10:34

## 2024-06-29 ASSESSMENT — PAIN SCALES - GENERAL: PAINLEVEL_OUTOF10: 10 - WORST POSSIBLE PAIN

## 2024-06-29 ASSESSMENT — PAIN - FUNCTIONAL ASSESSMENT: PAIN_FUNCTIONAL_ASSESSMENT: 0-10

## 2024-06-29 ASSESSMENT — COLUMBIA-SUICIDE SEVERITY RATING SCALE - C-SSRS
6. HAVE YOU EVER DONE ANYTHING, STARTED TO DO ANYTHING, OR PREPARED TO DO ANYTHING TO END YOUR LIFE?: NO
2. HAVE YOU ACTUALLY HAD ANY THOUGHTS OF KILLING YOURSELF?: NO
1. IN THE PAST MONTH, HAVE YOU WISHED YOU WERE DEAD OR WISHED YOU COULD GO TO SLEEP AND NOT WAKE UP?: NO

## 2024-06-29 NOTE — ED PROVIDER NOTES
HPI   Chief Complaint   Patient presents with    Foot Pain        63-year-old male presents today with pain to the left lateral fifth metatarsal on his left foot.  He endorses slight swelling.  It started on Thursday and he rates the pain 10 out of 10 when he ambulates.  He has been using a cane.  He is a known diabetic.  His vital signs were normal and stable in triage.  He denies headache, weakness, chest pain, dyspnea, fever or chills, abdominal pain, nausea or vomiting.  He does not have a podiatrist.  He denies any change in skin temperature or color.      History provided by:  Patient   used: No                        Jad Coma Scale Score: 15                     Patient History   Past Medical History:   Diagnosis Date    BPH (benign prostatic hyperplasia)     DM2 (diabetes mellitus, type 2) ([facility])     2020 a1c 6.7    ED (erectile dysfunction)     GERD (gastroesophageal reflux disease)     Hyperlipidemia      Past Surgical History:   Procedure Laterality Date    EXPLORATORY LAPAROTOMY      EYE MUSCLE SURGERY      teenager    PROSTATE SURGERY  2023    Prostate Holmium Laser Transurethral Enucleation     Family History   Problem Relation Name Age of Onset    Lung cancer Mother      Prostate cancer Father      Dementia Father      Breast cancer Sister      Prostate cancer Brother       Social History     Tobacco Use    Smoking status: Former     Current packs/day: 0.00     Average packs/day: 1 pack/day for 15.0 years (15.0 ttl pk-yrs)     Types: Cigarettes     Start date:      Quit date:      Years since quittin.5    Smokeless tobacco: Never   Vaping Use    Vaping status: Never Used   Substance Use Topics    Alcohol use: Not Currently     Comment: 2002 went into treament, no use since    Drug use: Not Currently     Comment: 2002 went into treament, no use since       Physical Exam   ED Triage Vitals [24 0900]   Temperature Heart Rate Respirations BP    36.8 °C (98.2 °F) 89 16 154/89      Pulse Ox Temp src Heart Rate Source Patient Position   98 % -- -- --      BP Location FiO2 (%)     -- --       Physical Exam  Constitutional:       Appearance: Normal appearance.   HENT:      Head: Normocephalic and atraumatic.      Nose: Nose normal.      Mouth/Throat:      Mouth: Mucous membranes are moist.   Eyes:      Extraocular Movements: Extraocular movements intact.      Pupils: Pupils are equal, round, and reactive to light.   Cardiovascular:      Rate and Rhythm: Normal rate and regular rhythm.      Pulses: Normal pulses.      Heart sounds: Normal heart sounds.   Pulmonary:      Effort: Pulmonary effort is normal.      Breath sounds: Normal breath sounds.   Abdominal:      General: Abdomen is flat.   Musculoskeletal:         General: Swelling and tenderness present. Normal range of motion.      Cervical back: Normal range of motion.      Comments: Left lateral aspect of fifth metatarsal tender and slightly swollen.   Skin:     General: Skin is warm.      Capillary Refill: Capillary refill takes less than 2 seconds.   Neurological:      General: No focal deficit present.      Mental Status: He is alert and oriented to person, place, and time.   Psychiatric:         Mood and Affect: Mood normal.         Behavior: Behavior normal.         ED Course & MDM   Diagnoses as of 06/29/24 1233   Closed fracture of base of fifth metatarsal bone of right foot       Medical Decision Making  X-ray was negative for acute fracture but he had a small calcaneal heel spur.  He received a boot and he is already using a cane.  I requested appointment with podiatry for outpatient follow-up.  I was able to identify and mario both the pedal and posterior tibial pulse.  Cap refill was less than 2 seconds and I was less concerned about claudication.  Patient appeared to ambulate without difficulty with the boot and that gave him much comfort.  He understands the importance of follow-up with  podiatry return precautions reviewed safely discharged home.    Amount and/or Complexity of Data Reviewed  Radiology: ordered and independent interpretation performed.        Procedure  Procedures     Andrea Moralez, DANIELLE-JACINTO  06/29/24 3245

## 2024-06-29 NOTE — Clinical Note
Robby Leon was seen and treated in our emergency department on 6/29/2024.  He may return to work on 07/03/2024.       If you have any questions or concerns, please don't hesitate to call.      Andrea Moralez, APRN-CNP

## 2025-02-03 ENCOUNTER — APPOINTMENT (OUTPATIENT)
Dept: CARDIOLOGY | Facility: HOSPITAL | Age: 65
DRG: 639 | End: 2025-02-03
Payer: COMMERCIAL

## 2025-02-03 ENCOUNTER — HOSPITAL ENCOUNTER (INPATIENT)
Facility: HOSPITAL | Age: 65
LOS: 1 days | Discharge: HOME | DRG: 639 | End: 2025-02-04
Attending: EMERGENCY MEDICINE | Admitting: INTERNAL MEDICINE
Payer: COMMERCIAL

## 2025-02-03 ENCOUNTER — APPOINTMENT (OUTPATIENT)
Dept: RADIOLOGY | Facility: HOSPITAL | Age: 65
DRG: 639 | End: 2025-02-03
Payer: COMMERCIAL

## 2025-02-03 DIAGNOSIS — E11.9 TYPE 2 DIABETES MELLITUS WITHOUT COMPLICATION, WITHOUT LONG-TERM CURRENT USE OF INSULIN (MULTI): ICD-10-CM

## 2025-02-03 DIAGNOSIS — R73.9 HYPERGLYCEMIA: Primary | ICD-10-CM

## 2025-02-03 LAB
ALBUMIN SERPL BCP-MCNC: 3.8 G/DL (ref 3.4–5)
ALP SERPL-CCNC: 75 U/L (ref 33–136)
ALT SERPL W P-5'-P-CCNC: 15 U/L (ref 10–52)
ANION GAP BLDV CALCULATED.4IONS-SCNC: 13 MMOL/L (ref 10–25)
ANION GAP SERPL CALC-SCNC: 11 MMOL/L (ref 10–20)
APPEARANCE UR: CLEAR
AST SERPL W P-5'-P-CCNC: 12 U/L (ref 9–39)
BASE EXCESS BLDV CALC-SCNC: -1.1 MMOL/L (ref -2–3)
BASOPHILS # BLD AUTO: 0.04 X10*3/UL (ref 0–0.1)
BASOPHILS NFR BLD AUTO: 0.7 %
BILIRUB SERPL-MCNC: 0.6 MG/DL (ref 0–1.2)
BILIRUB UR STRIP.AUTO-MCNC: NEGATIVE MG/DL
BODY TEMPERATURE: 37 DEGREES CELSIUS
BUN SERPL-MCNC: 7 MG/DL (ref 6–23)
CA-I BLDV-SCNC: 1.15 MMOL/L (ref 1.1–1.33)
CALCIUM SERPL-MCNC: 8.8 MG/DL (ref 8.6–10.3)
CHLORIDE BLDV-SCNC: 96 MMOL/L (ref 98–107)
CHLORIDE SERPL-SCNC: 99 MMOL/L (ref 98–107)
CO2 SERPL-SCNC: 25 MMOL/L (ref 21–32)
COLOR UR: ABNORMAL
CREAT SERPL-MCNC: 1.05 MG/DL (ref 0.5–1.3)
EGFRCR SERPLBLD CKD-EPI 2021: 80 ML/MIN/1.73M*2
EOSINOPHIL # BLD AUTO: 0.03 X10*3/UL (ref 0–0.7)
EOSINOPHIL NFR BLD AUTO: 0.5 %
ERYTHROCYTE [DISTWIDTH] IN BLOOD BY AUTOMATED COUNT: 11.9 % (ref 11.5–14.5)
GLUCOSE BLD MANUAL STRIP-MCNC: 241 MG/DL (ref 74–99)
GLUCOSE BLD MANUAL STRIP-MCNC: 473 MG/DL (ref 74–99)
GLUCOSE BLD MANUAL STRIP-MCNC: 520 MG/DL (ref 74–99)
GLUCOSE BLDV-MCNC: 544 MG/DL (ref 74–99)
GLUCOSE SERPL-MCNC: 523 MG/DL (ref 74–99)
GLUCOSE UR STRIP.AUTO-MCNC: ABNORMAL MG/DL
HCO3 BLDV-SCNC: 24.3 MMOL/L (ref 22–26)
HCT VFR BLD AUTO: 43.2 % (ref 41–52)
HCT VFR BLD EST: 44 % (ref 41–52)
HGB BLD-MCNC: 14.4 G/DL (ref 13.5–17.5)
HGB BLDV-MCNC: 14.8 G/DL (ref 13.5–17.5)
IMM GRANULOCYTES # BLD AUTO: 0.03 X10*3/UL (ref 0–0.7)
IMM GRANULOCYTES NFR BLD AUTO: 0.5 % (ref 0–0.9)
INHALED O2 CONCENTRATION: 21 %
KETONES UR STRIP.AUTO-MCNC: NEGATIVE MG/DL
LACTATE BLDV-SCNC: 1.9 MMOL/L (ref 0.4–2)
LACTATE SERPL-SCNC: 1.9 MMOL/L (ref 0.4–2)
LEUKOCYTE ESTERASE UR QL STRIP.AUTO: NEGATIVE
LYMPHOCYTES # BLD AUTO: 2.9 X10*3/UL (ref 1.2–4.8)
LYMPHOCYTES NFR BLD AUTO: 51.5 %
MAGNESIUM SERPL-MCNC: 2 MG/DL (ref 1.6–2.4)
MCH RBC QN AUTO: 29 PG (ref 26–34)
MCHC RBC AUTO-ENTMCNC: 33.3 G/DL (ref 32–36)
MCV RBC AUTO: 87 FL (ref 80–100)
MONOCYTES # BLD AUTO: 0.44 X10*3/UL (ref 0.1–1)
MONOCYTES NFR BLD AUTO: 7.8 %
NEUTROPHILS # BLD AUTO: 2.19 X10*3/UL (ref 1.2–7.7)
NEUTROPHILS NFR BLD AUTO: 39 %
NITRITE UR QL STRIP.AUTO: NEGATIVE
NRBC BLD-RTO: 0 /100 WBCS (ref 0–0)
OXYHGB MFR BLDV: 70.3 % (ref 45–75)
PCO2 BLDV: 42 MM HG (ref 41–51)
PH BLDV: 7.37 PH (ref 7.33–7.43)
PH UR STRIP.AUTO: 5.5 [PH]
PLATELET # BLD AUTO: 186 X10*3/UL (ref 150–450)
PO2 BLDV: 41 MM HG (ref 35–45)
POTASSIUM BLDV-SCNC: 3.9 MMOL/L (ref 3.5–5.3)
POTASSIUM SERPL-SCNC: 4.2 MMOL/L (ref 3.5–5.3)
PROT SERPL-MCNC: 6.4 G/DL (ref 6.4–8.2)
PROT UR STRIP.AUTO-MCNC: NEGATIVE MG/DL
RBC # BLD AUTO: 4.97 X10*6/UL (ref 4.5–5.9)
RBC # UR STRIP.AUTO: NEGATIVE /UL
SAO2 % BLDV: 72 % (ref 45–75)
SODIUM BLDV-SCNC: 129 MMOL/L (ref 136–145)
SODIUM SERPL-SCNC: 131 MMOL/L (ref 136–145)
SP GR UR STRIP.AUTO: 1.04
UROBILINOGEN UR STRIP.AUTO-MCNC: NORMAL MG/DL
WBC # BLD AUTO: 5.6 X10*3/UL (ref 4.4–11.3)

## 2025-02-03 PROCEDURE — 99285 EMERGENCY DEPT VISIT HI MDM: CPT | Mod: 25 | Performed by: EMERGENCY MEDICINE

## 2025-02-03 PROCEDURE — 82435 ASSAY OF BLOOD CHLORIDE: CPT | Performed by: NURSE PRACTITIONER

## 2025-02-03 PROCEDURE — 2500000004 HC RX 250 GENERAL PHARMACY W/ HCPCS (ALT 636 FOR OP/ED): Performed by: NURSE PRACTITIONER

## 2025-02-03 PROCEDURE — 70450 CT HEAD/BRAIN W/O DYE: CPT

## 2025-02-03 PROCEDURE — 70450 CT HEAD/BRAIN W/O DYE: CPT | Performed by: RADIOLOGY

## 2025-02-03 PROCEDURE — 2500000002 HC RX 250 W HCPCS SELF ADMINISTERED DRUGS (ALT 637 FOR MEDICARE OP, ALT 636 FOR OP/ED): Performed by: NURSE PRACTITIONER

## 2025-02-03 PROCEDURE — 36415 COLL VENOUS BLD VENIPUNCTURE: CPT | Performed by: NURSE PRACTITIONER

## 2025-02-03 PROCEDURE — 83605 ASSAY OF LACTIC ACID: CPT | Performed by: NURSE PRACTITIONER

## 2025-02-03 PROCEDURE — 82947 ASSAY GLUCOSE BLOOD QUANT: CPT

## 2025-02-03 PROCEDURE — 1210000001 HC SEMI-PRIVATE ROOM DAILY

## 2025-02-03 PROCEDURE — 85025 COMPLETE CBC W/AUTO DIFF WBC: CPT | Performed by: NURSE PRACTITIONER

## 2025-02-03 PROCEDURE — 83036 HEMOGLOBIN GLYCOSYLATED A1C: CPT | Mod: AHULAB | Performed by: NURSE PRACTITIONER

## 2025-02-03 PROCEDURE — 93005 ELECTROCARDIOGRAM TRACING: CPT

## 2025-02-03 PROCEDURE — 96360 HYDRATION IV INFUSION INIT: CPT

## 2025-02-03 PROCEDURE — 84132 ASSAY OF SERUM POTASSIUM: CPT | Performed by: NURSE PRACTITIONER

## 2025-02-03 PROCEDURE — G0378 HOSPITAL OBSERVATION PER HR: HCPCS

## 2025-02-03 PROCEDURE — 82805 BLOOD GASES W/O2 SATURATION: CPT | Performed by: NURSE PRACTITIONER

## 2025-02-03 PROCEDURE — 81003 URINALYSIS AUTO W/O SCOPE: CPT | Performed by: NURSE PRACTITIONER

## 2025-02-03 PROCEDURE — 83735 ASSAY OF MAGNESIUM: CPT | Performed by: NURSE PRACTITIONER

## 2025-02-03 RX ORDER — SODIUM CHLORIDE 9 MG/ML
100 INJECTION, SOLUTION INTRAVENOUS CONTINUOUS
Status: DISCONTINUED | OUTPATIENT
Start: 2025-02-03 | End: 2025-02-04 | Stop reason: HOSPADM

## 2025-02-03 RX ORDER — ATORVASTATIN CALCIUM 10 MG/1
10 TABLET, FILM COATED ORAL DAILY
Status: DISCONTINUED | OUTPATIENT
Start: 2025-02-04 | End: 2025-02-04 | Stop reason: HOSPADM

## 2025-02-03 RX ORDER — ACETAMINOPHEN 325 MG/1
650 TABLET ORAL EVERY 4 HOURS PRN
Status: DISCONTINUED | OUTPATIENT
Start: 2025-02-03 | End: 2025-02-04 | Stop reason: HOSPADM

## 2025-02-03 RX ORDER — ACETAMINOPHEN 160 MG/5ML
650 SUSPENSION ORAL EVERY 4 HOURS PRN
Status: DISCONTINUED | OUTPATIENT
Start: 2025-02-03 | End: 2025-02-04 | Stop reason: HOSPADM

## 2025-02-03 RX ORDER — ACETAMINOPHEN 650 MG/1
650 SUPPOSITORY RECTAL EVERY 4 HOURS PRN
Status: DISCONTINUED | OUTPATIENT
Start: 2025-02-03 | End: 2025-02-04 | Stop reason: HOSPADM

## 2025-02-03 RX ORDER — INSULIN LISPRO 100 [IU]/ML
0-10 INJECTION, SOLUTION INTRAVENOUS; SUBCUTANEOUS
Status: DISCONTINUED | OUTPATIENT
Start: 2025-02-04 | End: 2025-02-04 | Stop reason: HOSPADM

## 2025-02-03 RX ORDER — INSULIN LISPRO 100 [IU]/ML
10 INJECTION, SOLUTION INTRAVENOUS; SUBCUTANEOUS ONCE
Status: COMPLETED | OUTPATIENT
Start: 2025-02-03 | End: 2025-02-03

## 2025-02-03 RX ORDER — ENOXAPARIN SODIUM 100 MG/ML
40 INJECTION SUBCUTANEOUS EVERY 24 HOURS
Status: DISCONTINUED | OUTPATIENT
Start: 2025-02-04 | End: 2025-02-04 | Stop reason: HOSPADM

## 2025-02-03 RX ORDER — PANTOPRAZOLE SODIUM 40 MG/1
40 TABLET, DELAYED RELEASE ORAL
Status: DISCONTINUED | OUTPATIENT
Start: 2025-02-04 | End: 2025-02-04 | Stop reason: HOSPADM

## 2025-02-03 RX ADMIN — SODIUM CHLORIDE 2000 ML: 9 INJECTION, SOLUTION INTRAVENOUS at 18:54

## 2025-02-03 RX ADMIN — INSULIN LISPRO 10 UNITS: 100 INJECTION, SOLUTION INTRAVENOUS; SUBCUTANEOUS at 19:00

## 2025-02-03 ASSESSMENT — ENCOUNTER SYMPTOMS
MUSCULOSKELETAL NEGATIVE: 1
ACTIVITY CHANGE: 1
CARDIOVASCULAR NEGATIVE: 1
FATIGUE: 1
GASTROINTESTINAL NEGATIVE: 1
PSYCHIATRIC NEGATIVE: 1
ALLERGIC/IMMUNOLOGIC NEGATIVE: 1
RESPIRATORY NEGATIVE: 1
HEMATOLOGIC/LYMPHATIC NEGATIVE: 1
NEUROLOGICAL NEGATIVE: 1

## 2025-02-03 ASSESSMENT — PAIN - FUNCTIONAL ASSESSMENT: PAIN_FUNCTIONAL_ASSESSMENT: 0-10

## 2025-02-03 ASSESSMENT — PAIN DESCRIPTION - PROGRESSION: CLINICAL_PROGRESSION: NOT CHANGED

## 2025-02-03 ASSESSMENT — PAIN SCALES - GENERAL: PAINLEVEL_OUTOF10: 0 - NO PAIN

## 2025-02-03 NOTE — ED TRIAGE NOTES
Pt coming in today for high blood sugar. At home he took his sugar twice and it came back as a reading of high. Rechecked here where it was 520. Pt is a type 2 diabetes but isn't on any meds for it at this time. Complaining of increased urination and wanting to drink more water. Says he took some of his wife insulin (was given 4 units of lispro) roughly around 30 mins ago.

## 2025-02-04 ENCOUNTER — PHARMACY VISIT (OUTPATIENT)
Dept: PHARMACY | Facility: CLINIC | Age: 65
End: 2025-02-04
Payer: COMMERCIAL

## 2025-02-04 VITALS
HEART RATE: 62 BPM | HEIGHT: 75 IN | BODY MASS INDEX: 28.6 KG/M2 | RESPIRATION RATE: 16 BRPM | OXYGEN SATURATION: 95 % | TEMPERATURE: 96.8 F | WEIGHT: 230 LBS | DIASTOLIC BLOOD PRESSURE: 51 MMHG | SYSTOLIC BLOOD PRESSURE: 154 MMHG

## 2025-02-04 LAB
ANION GAP SERPL CALC-SCNC: 8 MMOL/L (ref 10–20)
ATRIAL RATE: 57 BPM
BUN SERPL-MCNC: 9 MG/DL (ref 6–23)
CALCIUM SERPL-MCNC: 8.3 MG/DL (ref 8.6–10.3)
CHLORIDE SERPL-SCNC: 105 MMOL/L (ref 98–107)
CO2 SERPL-SCNC: 28 MMOL/L (ref 21–32)
CREAT SERPL-MCNC: 0.96 MG/DL (ref 0.5–1.3)
EGFRCR SERPLBLD CKD-EPI 2021: 89 ML/MIN/1.73M*2
ERYTHROCYTE [DISTWIDTH] IN BLOOD BY AUTOMATED COUNT: 12.1 % (ref 11.5–14.5)
EST. AVERAGE GLUCOSE BLD GHB EST-MCNC: 473 MG/DL
GLUCOSE BLD MANUAL STRIP-MCNC: 233 MG/DL (ref 74–99)
GLUCOSE BLD MANUAL STRIP-MCNC: 264 MG/DL (ref 74–99)
GLUCOSE BLD MANUAL STRIP-MCNC: 288 MG/DL (ref 74–99)
GLUCOSE BLD MANUAL STRIP-MCNC: 307 MG/DL (ref 74–99)
GLUCOSE SERPL-MCNC: 247 MG/DL (ref 74–99)
HBA1C MFR BLD: 18.1 %
HCT VFR BLD AUTO: 38.7 % (ref 41–52)
HGB BLD-MCNC: 12.8 G/DL (ref 13.5–17.5)
MCH RBC QN AUTO: 29 PG (ref 26–34)
MCHC RBC AUTO-ENTMCNC: 33.1 G/DL (ref 32–36)
MCV RBC AUTO: 88 FL (ref 80–100)
NRBC BLD-RTO: 0 /100 WBCS (ref 0–0)
P AXIS: -18 DEGREES
P OFFSET: 202 MS
P ONSET: 139 MS
PLATELET # BLD AUTO: 179 X10*3/UL (ref 150–450)
POTASSIUM SERPL-SCNC: 3.7 MMOL/L (ref 3.5–5.3)
PR INTERVAL: 166 MS
Q ONSET: 222 MS
QRS COUNT: 9 BEATS
QRS DURATION: 86 MS
QT INTERVAL: 428 MS
QTC CALCULATION(BAZETT): 416 MS
QTC FREDERICIA: 420 MS
R AXIS: -8 DEGREES
RBC # BLD AUTO: 4.42 X10*6/UL (ref 4.5–5.9)
SODIUM SERPL-SCNC: 137 MMOL/L (ref 136–145)
T AXIS: -1 DEGREES
T OFFSET: 436 MS
VENTRICULAR RATE: 57 BPM
WBC # BLD AUTO: 6.7 X10*3/UL (ref 4.4–11.3)

## 2025-02-04 PROCEDURE — G0378 HOSPITAL OBSERVATION PER HR: HCPCS

## 2025-02-04 PROCEDURE — 82947 ASSAY GLUCOSE BLOOD QUANT: CPT

## 2025-02-04 PROCEDURE — 99222 1ST HOSP IP/OBS MODERATE 55: CPT | Performed by: INTERNAL MEDICINE

## 2025-02-04 PROCEDURE — 2500000004 HC RX 250 GENERAL PHARMACY W/ HCPCS (ALT 636 FOR OP/ED): Performed by: NURSE PRACTITIONER

## 2025-02-04 PROCEDURE — 2500000002 HC RX 250 W HCPCS SELF ADMINISTERED DRUGS (ALT 637 FOR MEDICARE OP, ALT 636 FOR OP/ED): Performed by: INTERNAL MEDICINE

## 2025-02-04 PROCEDURE — 2500000001 HC RX 250 WO HCPCS SELF ADMINISTERED DRUGS (ALT 637 FOR MEDICARE OP): Performed by: NURSE PRACTITIONER

## 2025-02-04 PROCEDURE — 99239 HOSP IP/OBS DSCHRG MGMT >30: CPT | Performed by: STUDENT IN AN ORGANIZED HEALTH CARE EDUCATION/TRAINING PROGRAM

## 2025-02-04 PROCEDURE — 36415 COLL VENOUS BLD VENIPUNCTURE: CPT | Performed by: NURSE PRACTITIONER

## 2025-02-04 PROCEDURE — RXMED WILLOW AMBULATORY MEDICATION CHARGE

## 2025-02-04 PROCEDURE — 83519 RIA NONANTIBODY: CPT | Performed by: NURSE PRACTITIONER

## 2025-02-04 PROCEDURE — 85027 COMPLETE CBC AUTOMATED: CPT | Performed by: NURSE PRACTITIONER

## 2025-02-04 PROCEDURE — 2500000004 HC RX 250 GENERAL PHARMACY W/ HCPCS (ALT 636 FOR OP/ED): Performed by: INTERNAL MEDICINE

## 2025-02-04 PROCEDURE — 80048 BASIC METABOLIC PNL TOTAL CA: CPT | Performed by: NURSE PRACTITIONER

## 2025-02-04 PROCEDURE — 82310 ASSAY OF CALCIUM: CPT | Performed by: NURSE PRACTITIONER

## 2025-02-04 RX ORDER — LANCETS
EACH MISCELLANEOUS
Qty: 100 EACH | Refills: 0 | Status: SHIPPED | OUTPATIENT
Start: 2025-02-04

## 2025-02-04 RX ORDER — INSULIN LISPRO 100 [IU]/ML
6 INJECTION, SOLUTION INTRAVENOUS; SUBCUTANEOUS ONCE
Status: COMPLETED | OUTPATIENT
Start: 2025-02-04 | End: 2025-02-04

## 2025-02-04 RX ORDER — DEXTROSE 4 G
TABLET,CHEWABLE ORAL
Qty: 1 EACH | Refills: 0 | Status: SHIPPED | OUTPATIENT
Start: 2025-02-04

## 2025-02-04 RX ORDER — INSULIN LISPRO 100 [IU]/ML
INJECTION, SUSPENSION SUBCUTANEOUS
Qty: 45 ML | Refills: 11 | Status: SHIPPED | OUTPATIENT
Start: 2025-02-04 | End: 2026-02-04

## 2025-02-04 RX ORDER — METFORMIN HYDROCHLORIDE 500 MG/1
500 TABLET ORAL
Qty: 90 TABLET | Refills: 0 | Status: SHIPPED | OUTPATIENT
Start: 2025-02-04 | End: 2025-02-04 | Stop reason: HOSPADM

## 2025-02-04 RX ADMIN — ACETAMINOPHEN 650 MG: 325 TABLET, FILM COATED ORAL at 02:34

## 2025-02-04 RX ADMIN — INSULIN LISPRO 4 UNITS: 100 INJECTION, SOLUTION INTRAVENOUS; SUBCUTANEOUS at 08:08

## 2025-02-04 RX ADMIN — INSULIN LISPRO 6 UNITS: 100 INJECTION, SOLUTION INTRAVENOUS; SUBCUTANEOUS at 12:09

## 2025-02-04 RX ADMIN — INSULIN LISPRO 6 UNITS: 100 INJECTION, SOLUTION INTRAVENOUS; SUBCUTANEOUS at 02:06

## 2025-02-04 RX ADMIN — ENOXAPARIN SODIUM 40 MG: 40 INJECTION SUBCUTANEOUS at 08:09

## 2025-02-04 RX ADMIN — ATORVASTATIN CALCIUM 10 MG: 10 TABLET, FILM COATED ORAL at 08:09

## 2025-02-04 RX ADMIN — PANTOPRAZOLE SODIUM 40 MG: 40 TABLET, DELAYED RELEASE ORAL at 08:09

## 2025-02-04 RX ADMIN — SODIUM CHLORIDE 100 ML/HR: 9 INJECTION, SOLUTION INTRAVENOUS at 02:06

## 2025-02-04 SDOH — HEALTH STABILITY: MENTAL HEALTH
DO YOU FEEL STRESS - TENSE, RESTLESS, NERVOUS, OR ANXIOUS, OR UNABLE TO SLEEP AT NIGHT BECAUSE YOUR MIND IS TROUBLED ALL THE TIME - THESE DAYS?: NOT AT ALL

## 2025-02-04 SDOH — SOCIAL STABILITY: SOCIAL INSECURITY: HAVE YOU HAD THOUGHTS OF HARMING ANYONE ELSE?: NO

## 2025-02-04 SDOH — SOCIAL STABILITY: SOCIAL NETWORK: HOW OFTEN DO YOU ATTEND CHURCH OR RELIGIOUS SERVICES?: NEVER

## 2025-02-04 SDOH — SOCIAL STABILITY: SOCIAL INSECURITY
WITHIN THE LAST YEAR, HAVE YOU BEEN RAPED OR FORCED TO HAVE ANY KIND OF SEXUAL ACTIVITY BY YOUR PARTNER OR EX-PARTNER?: NO

## 2025-02-04 SDOH — ECONOMIC STABILITY: INCOME INSECURITY: IN THE PAST 12 MONTHS HAS THE ELECTRIC, GAS, OIL, OR WATER COMPANY THREATENED TO SHUT OFF SERVICES IN YOUR HOME?: YES

## 2025-02-04 SDOH — SOCIAL STABILITY: SOCIAL INSECURITY: WITHIN THE LAST YEAR, HAVE YOU BEEN AFRAID OF YOUR PARTNER OR EX-PARTNER?: NO

## 2025-02-04 SDOH — ECONOMIC STABILITY: FOOD INSECURITY: HOW HARD IS IT FOR YOU TO PAY FOR THE VERY BASICS LIKE FOOD, HOUSING, MEDICAL CARE, AND HEATING?: NOT HARD AT ALL

## 2025-02-04 SDOH — SOCIAL STABILITY: SOCIAL INSECURITY: DO YOU FEEL UNSAFE GOING BACK TO THE PLACE WHERE YOU ARE LIVING?: NO

## 2025-02-04 SDOH — SOCIAL STABILITY: SOCIAL INSECURITY: ABUSE: ADULT

## 2025-02-04 SDOH — SOCIAL STABILITY: SOCIAL INSECURITY: WITHIN THE LAST YEAR, HAVE YOU BEEN HUMILIATED OR EMOTIONALLY ABUSED IN OTHER WAYS BY YOUR PARTNER OR EX-PARTNER?: NO

## 2025-02-04 SDOH — ECONOMIC STABILITY: TRANSPORTATION INSECURITY: IN THE PAST 12 MONTHS, HAS LACK OF TRANSPORTATION KEPT YOU FROM MEDICAL APPOINTMENTS OR FROM GETTING MEDICATIONS?: NO

## 2025-02-04 SDOH — HEALTH STABILITY: PHYSICAL HEALTH: ON AVERAGE, HOW MANY MINUTES DO YOU ENGAGE IN EXERCISE AT THIS LEVEL?: 0 MIN

## 2025-02-04 SDOH — ECONOMIC STABILITY: FOOD INSECURITY: WITHIN THE PAST 12 MONTHS, YOU WORRIED THAT YOUR FOOD WOULD RUN OUT BEFORE YOU GOT THE MONEY TO BUY MORE.: NEVER TRUE

## 2025-02-04 SDOH — ECONOMIC STABILITY: HOUSING INSECURITY: AT ANY TIME IN THE PAST 12 MONTHS, WERE YOU HOMELESS OR LIVING IN A SHELTER (INCLUDING NOW)?: NO

## 2025-02-04 SDOH — SOCIAL STABILITY: SOCIAL NETWORK: IN A TYPICAL WEEK, HOW MANY TIMES DO YOU TALK ON THE PHONE WITH FAMILY, FRIENDS, OR NEIGHBORS?: NEVER

## 2025-02-04 SDOH — HEALTH STABILITY: PHYSICAL HEALTH: ON AVERAGE, HOW MANY DAYS PER WEEK DO YOU ENGAGE IN MODERATE TO STRENUOUS EXERCISE (LIKE A BRISK WALK)?: 0 DAYS

## 2025-02-04 SDOH — SOCIAL STABILITY: SOCIAL INSECURITY: DOES ANYONE TRY TO KEEP YOU FROM HAVING/CONTACTING OTHER FRIENDS OR DOING THINGS OUTSIDE YOUR HOME?: NO

## 2025-02-04 SDOH — SOCIAL STABILITY: SOCIAL INSECURITY: ARE YOU OR HAVE YOU BEEN THREATENED OR ABUSED PHYSICALLY, EMOTIONALLY, OR SEXUALLY BY ANYONE?: NO

## 2025-02-04 SDOH — SOCIAL STABILITY: SOCIAL INSECURITY: POSSIBLE ABUSE REPORTED TO:: ADVOCATE

## 2025-02-04 SDOH — SOCIAL STABILITY: SOCIAL NETWORK: HOW OFTEN DO YOU ATTEND MEETINGS OF THE CLUBS OR ORGANIZATIONS YOU BELONG TO?: NEVER

## 2025-02-04 SDOH — SOCIAL STABILITY: SOCIAL NETWORK: HOW OFTEN DO YOU GET TOGETHER WITH FRIENDS OR RELATIVES?: NEVER

## 2025-02-04 SDOH — ECONOMIC STABILITY: FOOD INSECURITY: WITHIN THE PAST 12 MONTHS, THE FOOD YOU BOUGHT JUST DIDN'T LAST AND YOU DIDN'T HAVE MONEY TO GET MORE.: NEVER TRUE

## 2025-02-04 SDOH — SOCIAL STABILITY: SOCIAL INSECURITY
WITHIN THE LAST YEAR, HAVE YOU BEEN KICKED, HIT, SLAPPED, OR OTHERWISE PHYSICALLY HURT BY YOUR PARTNER OR EX-PARTNER?: NO

## 2025-02-04 SDOH — SOCIAL STABILITY: SOCIAL INSECURITY: HAVE YOU HAD ANY THOUGHTS OF HARMING ANYONE ELSE?: NO

## 2025-02-04 SDOH — SOCIAL STABILITY: SOCIAL NETWORK
DO YOU BELONG TO ANY CLUBS OR ORGANIZATIONS SUCH AS CHURCH GROUPS, UNIONS, FRATERNAL OR ATHLETIC GROUPS, OR SCHOOL GROUPS?: NO

## 2025-02-04 SDOH — SOCIAL STABILITY: SOCIAL INSECURITY: HAS ANYONE EVER THREATENED TO HURT YOUR FAMILY OR YOUR PETS?: NO

## 2025-02-04 SDOH — SOCIAL STABILITY: SOCIAL INSECURITY: ARE YOU MARRIED, WIDOWED, DIVORCED, SEPARATED, NEVER MARRIED, OR LIVING WITH A PARTNER?: MARRIED

## 2025-02-04 SDOH — ECONOMIC STABILITY: HOUSING INSECURITY: IN THE PAST 12 MONTHS, HOW MANY TIMES HAVE YOU MOVED WHERE YOU WERE LIVING?: 0

## 2025-02-04 SDOH — ECONOMIC STABILITY: HOUSING INSECURITY: IN THE LAST 12 MONTHS, WAS THERE A TIME WHEN YOU WERE NOT ABLE TO PAY THE MORTGAGE OR RENT ON TIME?: NO

## 2025-02-04 SDOH — HEALTH STABILITY: PHYSICAL HEALTH
HOW OFTEN DO YOU NEED TO HAVE SOMEONE HELP YOU WHEN YOU READ INSTRUCTIONS, PAMPHLETS, OR OTHER WRITTEN MATERIAL FROM YOUR DOCTOR OR PHARMACY?: NEVER

## 2025-02-04 SDOH — SOCIAL STABILITY: SOCIAL INSECURITY: DO YOU FEEL ANYONE HAS EXPLOITED OR TAKEN ADVANTAGE OF YOU FINANCIALLY OR OF YOUR PERSONAL PROPERTY?: NO

## 2025-02-04 SDOH — SOCIAL STABILITY: SOCIAL INSECURITY: ARE THERE ANY APPARENT SIGNS OF INJURIES/BEHAVIORS THAT COULD BE RELATED TO ABUSE/NEGLECT?: NO

## 2025-02-04 ASSESSMENT — COGNITIVE AND FUNCTIONAL STATUS - GENERAL
DAILY ACTIVITIY SCORE: 24
DAILY ACTIVITIY SCORE: 24
MOBILITY SCORE: 24
MOBILITY SCORE: 24
PATIENT BASELINE BEDBOUND: NO

## 2025-02-04 ASSESSMENT — ENCOUNTER SYMPTOMS
NAUSEA: 0
UNEXPECTED WEIGHT CHANGE: 1
FREQUENCY: 1
VOMITING: 0
SHORTNESS OF BREATH: 0
CONSTIPATION: 0
ENDOCRINE COMMENTS: AS ABOVE
DIARRHEA: 0
HEADACHES: 1

## 2025-02-04 ASSESSMENT — ACTIVITIES OF DAILY LIVING (ADL)
LACK_OF_TRANSPORTATION: NO
LACK_OF_TRANSPORTATION: NO
BATHING: INDEPENDENT
FEEDING YOURSELF: INDEPENDENT
DRESSING YOURSELF: INDEPENDENT
TOILETING: INDEPENDENT
JUDGMENT_ADEQUATE_SAFELY_COMPLETE_DAILY_ACTIVITIES: YES
WALKS IN HOME: INDEPENDENT
GROOMING: INDEPENDENT
ADEQUATE_TO_COMPLETE_ADL: YES
HEARING - LEFT EAR: FUNCTIONAL
PATIENT'S MEMORY ADEQUATE TO SAFELY COMPLETE DAILY ACTIVITIES?: YES
HEARING - RIGHT EAR: FUNCTIONAL

## 2025-02-04 ASSESSMENT — PATIENT HEALTH QUESTIONNAIRE - PHQ9
1. LITTLE INTEREST OR PLEASURE IN DOING THINGS: NOT AT ALL
SUM OF ALL RESPONSES TO PHQ9 QUESTIONS 1 & 2: 0
2. FEELING DOWN, DEPRESSED OR HOPELESS: NOT AT ALL

## 2025-02-04 ASSESSMENT — LIFESTYLE VARIABLES
SKIP TO QUESTIONS 9-10: 1
PRESCIPTION_ABUSE_PAST_12_MONTHS: NO
SUBSTANCE_ABUSE_PAST_12_MONTHS: NO
AUDIT-C TOTAL SCORE: 0
AUDIT-C TOTAL SCORE: 0
HOW OFTEN DO YOU HAVE 6 OR MORE DRINKS ON ONE OCCASION: NEVER
HOW OFTEN DO YOU HAVE A DRINK CONTAINING ALCOHOL: NEVER
HOW MANY STANDARD DRINKS CONTAINING ALCOHOL DO YOU HAVE ON A TYPICAL DAY: PATIENT DOES NOT DRINK

## 2025-02-04 ASSESSMENT — PAIN SCALES - GENERAL
PAINLEVEL_OUTOF10: 0 - NO PAIN
PAINLEVEL_OUTOF10: 3
PAINLEVEL_OUTOF10: 0 - NO PAIN

## 2025-02-04 ASSESSMENT — PAIN DESCRIPTION - LOCATION: LOCATION: MOUTH

## 2025-02-04 ASSESSMENT — PAIN SCALES - PAIN ASSESSMENT IN ADVANCED DEMENTIA (PAINAD): TOTALSCORE: MEDICATION (SEE MAR)

## 2025-02-04 ASSESSMENT — PAIN - FUNCTIONAL ASSESSMENT
PAIN_FUNCTIONAL_ASSESSMENT: 0-10
PAIN_FUNCTIONAL_ASSESSMENT: 0-10

## 2025-02-04 NOTE — DISCHARGE SUMMARY
Discharge Diagnosis  Hyperglycemia    Issues Requiring Follow-Up  Pcp  Endocrine follow up     Discharge Meds     Medication List      START taking these medications     blood sugar diagnostic strip; Use to check blood sugar 2 times a day.   blood-glucose meter misc; Use to check blood sugar 2 times a day.   insulin lispro protamin-lispro 100 unit/mL (75-25) injection; Commonly   known as: HumaLOG Mix 75-25 KwikPen; Inject 10 units under the skin 2   times a day before breakfast and dinner as directed.   lancets misc; Use to check blood sugar 2 times a day.     CONTINUE taking these medications     atorvastatin 10 mg tablet; Commonly known as: Lipitor   omeprazole 20 mg DR capsule; Commonly known as: PriLOSEC   tiZANidine 2 mg tablet; Commonly known as: Zanaflex; Take 2 tablets (4   mg) by mouth 3 times a day as needed for muscle spasms.       Test Results Pending At Discharge  Pending Labs       No current pending labs.            Hospital Course   Robby Leon is a 64 y.o. male with a past medical history of type 2 diabetes, hyperlipidemia who presented to [facility] ER for hyperglycemia.  The patient states that he has been feeling well except for polyuria and blurred vision.  Today his wife checked his glucose and it was elevated above 500.  He does not take any medication for his diabetes as he does not see a physician on a regular basis or checked his glucose on a regular basis.  His wife gave him 4 units of her Humalog insulin.  His vision is improving with improvement  in his glycemic control.  He came in with a glucose of 523 recheck was 473 then 241. Patient was started on humalog corrective scale and endocrine was consulted. A1C resulted at 18.1. Patient was started on Humalog Mix 75/25 and discharged at his baseline level of health. Patient was ordered PCP/endocrine follow up. Patient was seen by diabetic educator and was discharged with diabetes equipment and insulin.      Pertinent  Physical Exam At Time of Discharge  Physical Exam  Vitals and nursing note reviewed.   Constitutional:       Appearance: Normal appearance.   HENT:      Head: Normocephalic.      Right Ear: External ear normal.      Left Ear: External ear normal.      Nose: Nose normal.      Mouth/Throat:      Mouth: Mucous membranes are dry.      Pharynx: Oropharynx is clear.   Eyes:      Extraocular Movements: Extraocular movements intact.      Conjunctiva/sclera: Conjunctivae normal.      Pupils: Pupils are equal, round, and reactive to light.   Cardiovascular:      Rate and Rhythm: Normal rate and regular rhythm.   Pulmonary:      Effort: Pulmonary effort is normal.      Breath sounds: Normal breath sounds.   Abdominal:      General: Abdomen is flat. Bowel sounds are normal.      Palpations: Abdomen is soft.   Musculoskeletal:         General: Normal range of motion.   Skin:     General: Skin is warm and dry.   Neurological:      General: No focal deficit present.      Mental Status: He is alert. Mental status is at baseline.   Psychiatric:         Mood and Affect: Mood normal.         Behavior: Behavior normal.   Outpatient Follow-Up  No future appointments.      Gay Ibarra MD

## 2025-02-04 NOTE — PROGRESS NOTES
Transitional Care Coordination Progress Note:  Plan per Medical/Surgical team: treatment of hyperglycemia with Insulin, Endocrine consult & diabetic educator  Status: Inpatient   Payor source: medical mutual   Discharge disposition: Home with wife  Potential Barriers: glucose 523 to 247  Needs:  DM education & supplies  PCP  ADOD: 2/6/2025  YAO Rouse RN, BSN Transitional Care Coordinator ED# [phone]      02/04/25 0989   Discharge Planning   Living Arrangements Spouse/significant other   Support Systems Spouse/significant other   Assistance Needed Insulin  Endocrine consult    Needs:  DM education & supplies  PCP   Type of Residence Private residence   Number of Stairs to Enter Residence 0   Number of Stairs Within Residence 0   Home or Post Acute Services Community services   Expected Discharge Disposition Home   Does the patient need discharge transport arranged? No   Financial Resource Strain   How hard is it for you to pay for the very basics like food, housing, medical care, and heating? Not hard   Housing Stability   In the last 12 months, was there a time when you were not able to pay the mortgage or rent on time? N   In the past 12 months, how many times have you moved where you were living? 1   At any time in the past 12 months, were you homeless or living in a shelter (including now)? N   Transportation Needs   In the past 12 months, has lack of transportation kept you from medical appointments or from getting medications? no   In the past 12 months, has lack of transportation kept you from meetings, work, or from getting things needed for daily living? No   Stroke Family Assessment   Stroke Family Assessment Needed No   Intensity of Service   Intensity of Service 0-30 min

## 2025-02-04 NOTE — H&P
History Of Present Illness  Robby Leon is a 64 y.o. male with a past medical history of type 2 diabetes, hyperlipidemia who presented to [facility] ER for hyperglycemia.  The patient states that he has been feeling well except for polyuria and blurred vision.  Today his wife checked his glucose and it was elevated above 500.  He does not take any medication for his diabetes as he does not see a physician on a regular basis or checked his glucose on a regular basis.  His wife gave him 4 units of her Humalog insulin.  His vision is improving with improvement  in his glycemic control.  He came in with a glucose of 523 recheck was 473 then 241.     Past Medical History  Past Medical History:   Diagnosis Date    BPH (benign prostatic hyperplasia)     DM2 (diabetes mellitus, type 2) (Multi)     8/30/2020 a1c 6.7    ED (erectile dysfunction)     GERD (gastroesophageal reflux disease)     Hyperlipidemia         Surgical History  Past Surgical History:   Procedure Laterality Date    EXPLORATORY LAPAROTOMY  1988    EYE MUSCLE SURGERY      teenager    PROSTATE SURGERY  11/20/2023    Prostate Holmium Laser Transurethral Enucleation         Social History  He reports that he quit smoking about 24 years ago. His smoking use included cigarettes. He started smoking about 39 years ago. He has a 15 pack-year smoking history. He has never used smokeless tobacco. He reports that he does not currently use alcohol. He reports that he does not currently use drugs.    Family History  Family History   Problem Relation Name Age of Onset    Lung cancer Mother      Prostate cancer Father      Dementia Father      Breast cancer Sister      Prostate cancer Brother          Allergies  Patient has no known allergies.    Review of Systems   Constitutional:  Positive for activity change and fatigue.   HENT: Negative.     Eyes:  Positive for visual disturbance.   Respiratory: Negative.     Cardiovascular: Negative.   "  Gastrointestinal: Negative.    Endocrine: Positive for polyuria.   Genitourinary: Negative.    Musculoskeletal: Negative.    Skin: Negative.    Allergic/Immunologic: Negative.    Neurological: Negative.    Hematological: Negative.    Psychiatric/Behavioral: Negative.     All other systems reviewed and are negative.       Physical Exam  Vitals and nursing note reviewed.   Constitutional:       Appearance: Normal appearance.   HENT:      Head: Normocephalic.      Right Ear: External ear normal.      Left Ear: External ear normal.      Nose: Nose normal.      Mouth/Throat:      Mouth: Mucous membranes are dry.      Pharynx: Oropharynx is clear.   Eyes:      Extraocular Movements: Extraocular movements intact.      Conjunctiva/sclera: Conjunctivae normal.      Pupils: Pupils are equal, round, and reactive to light.   Cardiovascular:      Rate and Rhythm: Normal rate and regular rhythm.   Pulmonary:      Effort: Pulmonary effort is normal.      Breath sounds: Normal breath sounds.   Abdominal:      General: Abdomen is flat. Bowel sounds are normal.      Palpations: Abdomen is soft.   Musculoskeletal:         General: Normal range of motion.   Skin:     General: Skin is warm and dry.   Neurological:      General: No focal deficit present.      Mental Status: He is alert. Mental status is at baseline.   Psychiatric:         Mood and Affect: Mood normal.         Behavior: Behavior normal.          Last Recorded Vitals  Blood pressure 126/80, pulse 59, temperature 36.1 °C (97 °F), temperature source Temporal, resp. rate 16, height 1.905 m (6' 3\"), weight 104 kg (230 lb), SpO2 95%.    Relevant Results  Meds:  Scheduled medications  [START ON 2/4/2025] atorvastatin, 10 mg, oral, Daily  [START ON 2/4/2025] enoxaparin, 40 mg, subcutaneous, q24h  [START ON 2/4/2025] insulin lispro, 0-10 Units, subcutaneous, TID AC  [START ON 2/4/2025] pantoprazole, 40 mg, oral, Daily before breakfast      Continuous medications     PRN " medications  PRN medications: acetaminophen **OR** acetaminophen **OR** acetaminophen   Current Outpatient Medications   Medication Instructions    atorvastatin (Lipitor) 10 mg tablet 1 tablet, oral, Daily    omeprazole (PRILOSEC) 20 mg, oral, Do not crush or chew.    tiZANidine (ZANAFLEX) 4 mg, oral, 3 times daily PRN        Labs:  Results for orders placed or performed during the hospital encounter of 02/03/25 (from the past 24 hours)   POCT GLUCOSE   Result Value Ref Range    POCT Glucose 520 (H) 74 - 99 mg/dL   CBC and Auto Differential   Result Value Ref Range    WBC 5.6 4.4 - 11.3 x10*3/uL    nRBC 0.0 0.0 - 0.0 /100 WBCs    RBC 4.97 4.50 - 5.90 x10*6/uL    Hemoglobin 14.4 13.5 - 17.5 g/dL    Hematocrit 43.2 41.0 - 52.0 %    MCV 87 80 - 100 fL    MCH 29.0 26.0 - 34.0 pg    MCHC 33.3 32.0 - 36.0 g/dL    RDW 11.9 11.5 - 14.5 %    Platelets 186 150 - 450 x10*3/uL    Neutrophils % 39.0 40.0 - 80.0 %    Immature Granulocytes %, Automated 0.5 0.0 - 0.9 %    Lymphocytes % 51.5 13.0 - 44.0 %    Monocytes % 7.8 2.0 - 10.0 %    Eosinophils % 0.5 0.0 - 6.0 %    Basophils % 0.7 0.0 - 2.0 %    Neutrophils Absolute 2.19 1.20 - 7.70 x10*3/uL    Immature Granulocytes Absolute, Automated 0.03 0.00 - 0.70 x10*3/uL    Lymphocytes Absolute 2.90 1.20 - 4.80 x10*3/uL    Monocytes Absolute 0.44 0.10 - 1.00 x10*3/uL    Eosinophils Absolute 0.03 0.00 - 0.70 x10*3/uL    Basophils Absolute 0.04 0.00 - 0.10 x10*3/uL   Comprehensive metabolic panel   Result Value Ref Range    Glucose 523 (HH) 74 - 99 mg/dL    Sodium 131 (L) 136 - 145 mmol/L    Potassium 4.2 3.5 - 5.3 mmol/L    Chloride 99 98 - 107 mmol/L    Bicarbonate 25 21 - 32 mmol/L    Anion Gap 11 10 - 20 mmol/L    Urea Nitrogen 7 6 - 23 mg/dL    Creatinine 1.05 0.50 - 1.30 mg/dL    eGFR 80 >60 mL/min/1.73m*2    Calcium 8.8 8.6 - 10.3 mg/dL    Albumin 3.8 3.4 - 5.0 g/dL    Alkaline Phosphatase 75 33 - 136 U/L    Total Protein 6.4 6.4 - 8.2 g/dL    AST 12 9 - 39 U/L    Bilirubin, Total  0.6 0.0 - 1.2 mg/dL    ALT 15 10 - 52 U/L   Magnesium   Result Value Ref Range    Magnesium 2.00 1.60 - 2.40 mg/dL   Blood Gas Venous Full Panel   Result Value Ref Range    POCT pH, Venous 7.37 7.33 - 7.43 pH    POCT pCO2, Venous 42 41 - 51 mm Hg    POCT pO2, Venous 41 35 - 45 mm Hg    POCT SO2, Venous 72 45 - 75 %    POCT Oxy Hemoglobin, Venous 70.3 45.0 - 75.0 %    POCT Hematocrit Calculated, Venous 44.0 41.0 - 52.0 %    POCT Sodium, Venous 129 (L) 136 - 145 mmol/L    POCT Potassium, Venous 3.9 3.5 - 5.3 mmol/L    POCT Chloride, Venous 96 (L) 98 - 107 mmol/L    POCT Ionized Calicum, Venous 1.15 1.10 - 1.33 mmol/L    POCT Glucose, Venous 544 (HH) 74 - 99 mg/dL    POCT Lactate, Venous 1.9 0.4 - 2.0 mmol/L    POCT Base Excess, Venous -1.1 -2.0 - 3.0 mmol/L    POCT HCO3 Calculated, Venous 24.3 22.0 - 26.0 mmol/L    POCT Hemoglobin, Venous 14.8 13.5 - 17.5 g/dL    POCT Anion Gap, Venous 13.0 10.0 - 25.0 mmol/L    Patient Temperature 37.0 degrees Celsius    FiO2 21 %   Lactate   Result Value Ref Range    Lactate 1.9 0.4 - 2.0 mmol/L   POCT GLUCOSE   Result Value Ref Range    POCT Glucose 473 (H) 74 - 99 mg/dL   Urinalysis with Reflex Culture and Microscopic   Result Value Ref Range    Color, Urine Light-Yellow Light-Yellow, Yellow, Dark-Yellow    Appearance, Urine Clear Clear    Specific Gravity, Urine 1.038 (N) 1.005 - 1.035    pH, Urine 5.5 5.0, 5.5, 6.0, 6.5, 7.0, 7.5, 8.0    Protein, Urine NEGATIVE NEGATIVE, 10 (TRACE), 20 (TRACE) mg/dL    Glucose, Urine OVER (4+) (A) Normal mg/dL    Blood, Urine NEGATIVE NEGATIVE    Ketones, Urine NEGATIVE NEGATIVE mg/dL    Bilirubin, Urine NEGATIVE NEGATIVE    Urobilinogen, Urine Normal Normal mg/dL    Nitrite, Urine NEGATIVE NEGATIVE    Leukocyte Esterase, Urine NEGATIVE NEGATIVE   POCT GLUCOSE   Result Value Ref Range    POCT Glucose 241 (H) 74 - 99 mg/dL      Imaging:  CT head wo IV contrast    Result Date: 2/3/2025  Interpreted By:  Saroj Guzmán, STUDY: CT HEAD WO IV  CONTRAST;  2/3/2025 7:13 pm   INDICATION: Signs/Symptoms:Off balance and weak.   COMPARISON: None.   ACCESSION NUMBER(S): [id number]   ORDERING CLINICIAN: ELINA JOHNSON   TECHNIQUE: Noncontrast axial CT scan of head was performed. Angled reformats in brain and bone windows were generated. The images were reviewed in bone, brain, blood and soft tissue windows.   FINDINGS: CSF Spaces: Mild brain atrophy evidence by prominence of ventricles, sulci and cisterns. There is no extraaxial fluid collection.   Parenchyma: Confluent areas of subcortical and periventricular white matter changes which given patient's age are suggestive of chronic small vessel ischemic disease. The grey-white differentiation is intact. There is no mass effect or midline shift.  There is no intracranial hemorrhage.   Calvarium: The calvarium is unremarkable.   Paranasal sinuses and mastoids: Visualized paranasal sinuses and mastoids are clear.       1. Mild brain atrophy. Associated findings suggestive of chronic small vessel ischemic disease particularly within bifrontal lobes.   2. No evidence of acute cortical infarct or intracranial hemorrhage. If there is persistent clinical concern for acute cortical infarction, MRI with diffusion-weighted images is a better means for further evaluation as clinically warranted.   MACRO: None   Signed by: Saroj Guzmán 2/3/2025 7:48 PM Dictation workstation:   [id number]      Assessment/Plan   Hyperglycemic hyperosmolar nonketotic state, uncontrolled diabetes  Plan:  Will check hemoglobin A1c  Humalog per corrective scale  Carbohydrate controlled diet  Endocrinology to see    Hyperlipidemia  Plan:  Atorvastatin 10 mg orally daily    GERD  Protonix    DVT prophylaxis  Plan:  Lovenox 40 mg subcu daily  SCDs    I spent 60 minutes in the professional and overall care of this patient.      Saroj Alfaro DO

## 2025-02-04 NOTE — PROGRESS NOTES
Pharmacy Medication History    Spoke to the patient, only takes 2 medications daily    Source of Information:     Additional concerns with the patient's PTA list.     The following updates were made to the Prior to Admission medication list:     Medications ADDED:     Medications CHANGED:    Medications REMOVED:     Medications NOT TAKING:   Tizanidine    Allergy reviewed : Yes    Meds 2 Beds : Yes    Outpatient pharmacy confirmed and updated in chart : Yes    Pharmacy name:     The list below reflectives the updated PTA list. Please review each medication in order reconciliation for additional clarification and justification.    Prior to Admission Medications   Prescriptions Last Dose Informant   atorvastatin (Lipitor) 10 mg tablet 2/2/2025 Bedtime    Sig: Take 1 tablet (10 mg) by mouth once daily.   omeprazole (PriLOSEC) 20 mg DR capsule 2/2/2025    Sig: Take 1 capsule (20 mg) by mouth. Do not crush or chew.   tiZANidine (Zanaflex) 2 mg tablet Not Taking    Sig: Take 2 tablets (4 mg) by mouth 3 times a day as needed for muscle spasms.   Patient not taking: Reported on 2/4/2025      Facility-Administered Medications: None       The list below reflectives the updated allergy list. Please review each documented allergy for additional clarification and justification.    No Known Allergies       02/04/25 at 11:56 AM - Chrissy Ron

## 2025-02-04 NOTE — PROGRESS NOTES
02/04/25 0927   [facility] Disability Status   Are you deaf or do you have serious difficulty hearing? N   Are you blind or do you have serious difficulty seeing, even when wearing glasses? N  (blurred vision)   Because of a physical, mental, or emotional condition, do you have serious difficulty concentrating, remembering, or making decisions? (5 years old or older) N   Do you have serious difficulty walking or climbing stairs? N   Do you have serious difficulty dressing or bathing? N   Because of a physical, mental, or emotional condition, do you have serious difficulty doing errands alone such as visiting the doctor? N

## 2025-02-04 NOTE — CONSULTS
Inpatient consult to Endocrinology  Consult performed by: Nathen Montgomery MD  Consult ordered by: Saroj Alfaro DO        Reason For Consult  Diabetes    History Of Present Illness  Robby Leon is a 64 y.o. male     Duration of type 2 diabetes mellitus:  4 years  Complications:  none    Insulin lispro total 16 units since yesterday    Patient has a 40+ lb weight loss  Blurred vision  Polyuria    Patient has had no diabetes follow up for almost 4 years  He states his A1c had been down to 6%    Medications    Current Facility-Administered Medications:     acetaminophen (Tylenol) tablet 650 mg, 650 mg, oral, q4h PRN, 650 mg at 02/04/25 0234 **OR** acetaminophen (Tylenol) suspension 650 mg, 650 mg, oral, q4h PRN **OR** acetaminophen (Tylenol) suppository 650 mg, 650 mg, rectal, q4h PRN, DANIELLE Vick-CNP    atorvastatin (Lipitor) tablet 10 mg, 10 mg, oral, Daily, DANIELLE Vick-CNP    enoxaparin (Lovenox) syringe 40 mg, 40 mg, subcutaneous, q24h, DANIELLE Vick-CNP    insulin lispro injection 0-10 Units, 0-10 Units, subcutaneous, TID AC, Saroj Alfaro DO    pantoprazole (ProtoNix) EC tablet 40 mg, 40 mg, oral, Daily before breakfast, DANIELLE Vick-CNP    sodium chloride 0.9% infusion, 100 mL/hr, intravenous, Continuous, Saroj Alfaro DO, Last Rate: 100 mL/hr at 02/04/25 0206, 100 mL/hr at 02/04/25 0206     Past Medical History  He has a past medical history of BPH (benign prostatic hyperplasia), DM2 (diabetes mellitus, type 2) (Multi), ED (erectile dysfunction), GERD (gastroesophageal reflux disease), and Hyperlipidemia.    Surgical History  He has a past surgical history that includes Eye muscle surgery; Exploratory laparotomy (1988); and Prostate surgery (11/20/2023).     Social History  He reports that he quit smoking about 24 years ago. His smoking use included cigarettes. He started smoking about 39 years ago. He has a 15 pack-year smoking history. He has never used smokeless  "tobacco. He reports that he does not currently use alcohol. He reports that he does not currently use drugs.    Family History  Family History   Problem Relation Name Age of Onset    Lung cancer Mother      Prostate cancer Father      Dementia Father      Breast cancer Sister      Prostate cancer Brother         Allergies  Patient has no known allergies.    Review of Systems   Constitutional:  Positive for unexpected weight change.   Eyes:  Positive for visual disturbance.   Respiratory:  Negative for shortness of breath.    Cardiovascular:  Negative for chest pain.   Gastrointestinal:  Negative for constipation, diarrhea, nausea and vomiting.   Endocrine:        As above   Genitourinary:  Positive for frequency.   Neurological:  Positive for headaches.         Last Recorded Vitals  Blood pressure 118/80, pulse 66, temperature 36.3 °C (97.3 °F), temperature source Temporal, resp. rate 14, height 1.905 m (6' 3\"), weight 104 kg (230 lb), SpO2 97%.    Physical Exam  Constitutional:       General: He is not in acute distress.  HENT:      Head: Normocephalic.      Mouth/Throat:      Mouth: Mucous membranes are moist.   Eyes:      Comments: Lateral deviation left eye   Neck:      Thyroid: No thyromegaly.   Cardiovascular:      Pulses:           Radial pulses are 2+ on the right side and 2+ on the left side.   Abdominal:      Tenderness: There is no abdominal tenderness.   Musculoskeletal:      Right lower leg: No edema.      Left lower leg: No edema.   Neurological:      Mental Status: He is alert.      Motor: No tremor.   Psychiatric:         Mood and Affect: Affect normal.          Relevant Results  Lab Results   Component Value Date    POCGLU 233 (H) 02/04/2025    POCGLU 307 (H) 02/04/2025    POCGLU 288 (H) 02/04/2025    POCGLU 241 (H) 02/03/2025    POCGLU 473 (H) 02/03/2025    GLUCOSE 247 (H) 02/04/2025    GLUCOSE 523 (HH) 02/03/2025    GLUCOSE 124 (H) 11/16/2023    GLUCOSE 153 (H) 10/05/2023    GLUCOSE 192 (H) " 05/27/2023      Latest Reference Range & Units 02/03/25 18:31 02/04/25 04:17   GLUCOSE 74 - 99 mg/dL 523 (HH) 247 (H)   SODIUM 136 - 145 mmol/L 131 (L) 137   POTASSIUM 3.5 - 5.3 mmol/L 4.2 3.7   CHLORIDE 98 - 107 mmol/L 99 105   Bicarbonate 21 - 32 mmol/L 25 28   Anion Gap 10 - 20 mmol/L 11 8 (L)   Blood Urea Nitrogen 6 - 23 mg/dL 7 9   Creatinine 0.50 - 1.30 mg/dL 1.05 0.96   EGFR >60 mL/min/1.73m*2 80 89   Calcium 8.6 - 10.3 mg/dL 8.8 8.3 (L)      Latest Reference Range & Units 02/03/25 18:31   Hemoglobin A1C See comment % 18.1 (H)         IMPRESSION  TYPE 2 DIABETES MELLITUS WITH HYPERGLYCEMIA   Patient presented with symptomatic hyperglycemia  Large weight loss  Polyuria  A1c reflects chronic hyperglycemia  No current diabetes medications      RECOMMENDATIONS  Continue lispro scale for now  Will determine scheduled insulin  He will need insulin at discharge, but may not require it permanently  Resume metformin if no intolerance  Ultimately he may be a candidate for GLP1-RA or even SGLT2-inhibitor  Diabetes education      Nathen Montgomery MD

## 2025-02-04 NOTE — ED PROVIDER NOTES
HPI   Chief Complaint   Patient presents with    Hyperglycemia       HPI  Patient is a 64-year-old male with a past medical history significant for type 2 diabetes, hyper lipidemia, BPH who presents for hyperglycemia.  Patient states that he has been feeling well except for some blurry vision lately which is bilateral and not accompanied with any headache, visual field cuts, diplopia or neurologic deficits.  Today he was with his wife who has diabetes any after to check his blood sugar to see how he was doing as he does not normally monitor it.  It was significantly elevated.  He does not take medications for his diabetes as he does not have a physician to prescribe them.  She gave him 4 units of her own insulin which she had never taken before and decided to bring him to the hospital as it was significantly elevated.  At present time he is feeling very well and has no complaints.  And the vision is improving slowly.        PMHx: As above  PSHx: Prostate resection  FamilyHx: Beatties  SocialHx: Sober for about 23 years  Allergies: NKDA  Medications: See Medication Reconciliation     ROS  As above otherwise denies      Physical Exam    GENERAL: Awake and Alert, No Acute Distress  HEENT: AT/NC, PERRL, EOMI, Normal Oropharynx, No Signs of Dehydration  NECK: Normal Inspection, No JVD  CARDIOVASCULAR: RRR, No M/R/G  RESPIRATORY: CTA Bilaterally, No Wheezes, Rales or Rhonchi, Chest Wall Non-tender  ABDOMEN: Soft, non-tender abdomen, Normal Bowel Sounds, No Distention  BACK: No CVA Tenderness  SKIN: Normal Color, Warm, Dry, No Rashes   EXTREMITIES: Non-Tender, Full ROM, No Pedal Edema  NEURO: A&O x 3, Normal Motor and Sensation, Normal Mood and Affect    Nursing Assessment and Vitals Reviewed    EKG showed a sinus bradycardia at 57 bpm.  There are no ischemic ST changes.  No axis deviation.    Medical Decision  Patient is a 64-year-old male with a past medical history significant for type 2 diabetes, hyper lipidemia, BPH  who presents for hyperglycemia.  Patient states that he has been feeling well except for some blurry vision lately which is bilateral and not accompanied with any headache, visual field cuts, diplopia or neurologic deficits.  Today he was with his wife who has diabetes any after to check his blood sugar to see how he was doing as he does not normally monitor it.  It was significantly elevated.  He does not take medications for his diabetes as he does not have a physician to prescribe them.  She gave him 4 units of her own insulin which she had never taken before and decided to bring him to the hospital as it was significantly elevated.  At present time he is feeling very well and has no complaints.  And the vision is improving slowly.      Evaluation he is very well-appearing and in no acute distress.  Lungs are clear and heart is regular.  Abdomen soft nontender nondistended.  He is neurologically intact.    Patient is noted to have hyperglycemia without signs of acidosis on lab work.  No emergent electrolyte imbalance.  No leukocytosis or anemia.  pH is within normal limits and anion gap is closed.  CT head showed small vessel ischemic disease.  No acute emergent pathology.    Patient blood sugars started to come down slowly with IV fluids and now is given insulin.  I am concerned the patient does not have any medical care established to follow him up closely enough at this time.  He also requires diabetic education and a plan.  Will admit patient for further blood sugar control, education, further workup and management.              Patient History   Past Medical History:   Diagnosis Date    BPH (benign prostatic hyperplasia)     DM2 (diabetes mellitus, type 2) (Multi)     8/30/2020 a1c 6.7    ED (erectile dysfunction)     GERD (gastroesophageal reflux disease)     Hyperlipidemia      Past Surgical History:   Procedure Laterality Date    EXPLORATORY LAPAROTOMY  1988    EYE MUSCLE SURGERY      teenager    PROSTATE  SURGERY  2023    Prostate Holmium Laser Transurethral Enucleation     Family History   Problem Relation Name Age of Onset    Lung cancer Mother      Prostate cancer Father      Dementia Father      Breast cancer Sister      Prostate cancer Brother       Social History     Tobacco Use    Smoking status: Former     Current packs/day: 0.00     Average packs/day: 1 pack/day for 15.0 years (15.0 ttl pk-yrs)     Types: Cigarettes     Start date:      Quit date:      Years since quittin.1    Smokeless tobacco: Never   Vaping Use    Vaping status: Never Used   Substance Use Topics    Alcohol use: Not Currently     Comment: 2002 went into treament, no use since    Drug use: Not Currently     Comment: 2002 went into treament, no use since       Physical Exam   ED Triage Vitals   Temperature Heart Rate Respirations BP   25 1753 25 1753 02/03/25 1753 02/03/25 1753   36.1 °C (97 °F) 75 16 (!) 135/93      Pulse Ox Temp Source Heart Rate Source Patient Position   25 1753 02/03/25 1753 02/03/25 191 --   98 % Temporal Monitor       BP Location FiO2 (%)     -- --             Physical Exam      ED Course & MDM   Diagnoses as of 25   Hyperglycemia                 No data recorded     [place] Coma Scale Score: 15 (25 1754 : Tiki Fajardo RN)                           Medical Decision Making      Procedure  Procedures     Marjorie Lopez MD  25       Marjorie Lopez MD  25

## 2025-02-04 NOTE — DISCHARGE INSTRUCTIONS
Mr. Leon it was a pleasure taking care of you.   You were admitted for high blood glucose levels. Your A1C is 18.1. You will be started on Humalog Mix 75/25. Please take 10 units prior to breakfast and dinner. You will also be started on metformin 500mg BID. I have placed referrals for a primary care doctor and a endocrinologist. You will be called to make these appointments. I have also included below a informational sheet on diabetes.   Best,  Your Internal Medicine team

## 2025-02-04 NOTE — CARE PLAN
The patient's goals for the shift include      The clinical goals for the shift include blood glucose management and education      Problem: Pain - Adult  Goal: Verbalizes/displays adequate comfort level or baseline comfort level  Outcome: Progressing     Problem: Safety - Adult  Goal: Free from fall injury  Outcome: Progressing     Problem: Discharge Planning  Goal: Discharge to home or other facility with appropriate resources  Outcome: Progressing     Problem: Chronic Conditions and Co-morbidities  Goal: Patient's chronic conditions and co-morbidity symptoms are monitored and maintained or improved  Outcome: Progressing     Problem: Nutrition  Goal: Nutrient intake appropriate for maintaining nutritional needs  Outcome: Progressing

## 2025-02-04 NOTE — NURSING NOTE
AVS discussed with pt. Meds to beds went to bedside to deliver meds. Iv removed. Belongings secured with pt prior to discharge. Pt discharged to home.

## 2025-02-04 NOTE — CONSULTS
"Reason For Consult  Uncontrolled T2DM    History Of Present Illness  oRbby Leon is a 64 y.o. male presenting with hyperglycemia.     Past Medical History  He has a past medical history of BPH (benign prostatic hyperplasia), DM2 (diabetes mellitus, type 2) (Multi), ED (erectile dysfunction), GERD (gastroesophageal reflux disease), and Hyperlipidemia.    Surgical History  He has a past surgical history that includes Eye muscle surgery; Exploratory laparotomy (1988); and Prostate surgery (11/20/2023).     Social History  He reports that he quit smoking about 24 years ago. His smoking use included cigarettes. He started smoking about 39 years ago. He has a 15 pack-year smoking history. He has never used smokeless tobacco. He reports that he does not currently use alcohol. He reports that he does not currently use drugs.    Family History  Family History   Problem Relation Name Age of Onset    Lung cancer Mother      Prostate cancer Father      Dementia Father      Breast cancer Sister      Prostate cancer Brother          Allergies  Patient has no known allergies.    Review of Systems  N/A     Physical Exam  N/A     Last Recorded Vitals  Blood pressure 130/78, pulse 71, temperature 36.4 °C (97.5 °F), temperature source Temporal, resp. rate 16, height 1.905 m (6' 3\"), weight 104 kg (230 lb), SpO2 100%.    Relevant Results  HbA1C 18.1%  BS >500mg/dl on admission  Last POCT 247mg/dl     Assessment/Plan   Mr. Robby Leon is a pleasant 64 year old male admitted to observation for hyperglycemia. He reports using his wife's glucometer and it read \"high\" twice thus prompting them to come to ED. DM education consult is order for uncontrolled T2DM.    DM/PMH  -PMH as listed in chart   -notable for HLD, denies cardiac or renal hisotry   -s/p prostate surgery  -endorses recent polyuria, polydipsia, N/T in b/l feet, blurry vision   -DM history of approx 5 ;years   -possible family history   -reviewed HbA1C as dx criteria " 18.1% (EAG >450mg/dl)   -long term effects, risk factors and co-morbidities associated with chronic hyperglycemia   -eyes, nerves, kidneys, CV, vascular issues, skin/infection risk  -overview of DM process  -role of insulin vs glucose in body   -lock vs key analogy  -pt does not have PCP established  -encouraged follow up with endocrinology as well    Medications  +statin use  -no current DM medications   -denies previous insulin use  -anticipating insulin therapy at discharge   -reviewed peak/onset/duration of insulin  -wife is IDDM (uses insulin pens) pt is somewhat familiar with pens   -will wait for recs and review pen admin whe appropriate  -will review insulin further     Glucose monitoring  -will need glucometer and testing supplies  -Pt interested in CGM   -PA through [employer] shows minimal $90 out of pocket ([employer] [place])  -will discuss with patient   -training/education as needed     Diet  -was working out quite extensively prior to prostate surgery  -not as much activity since  -pt endorses dietary habit decline since surgery  -high sugar/juice/pop and carbohydrate consumption  -discussed Diabetic Plate Method (50/25/25)   -encouraged high vegetable with appropriate starch consumption   -modification, limitations and substitutions discussed  -reviewed appropriate snacks and meal timing    Plan  -awaiting insulin recs and training  -pt to make determination on CGM   -training if needed  -establish care with PCP   -TCC assistance needed  -referral to endocrinology   -list provided    Josh Gaspar RN

## 2025-02-06 ENCOUNTER — OFFICE VISIT (OUTPATIENT)
Dept: ENDOCRINOLOGY | Facility: CLINIC | Age: 65
End: 2025-02-06
Payer: COMMERCIAL

## 2025-02-06 VITALS
DIASTOLIC BLOOD PRESSURE: 87 MMHG | WEIGHT: 229.9 LBS | HEIGHT: 75 IN | BODY MASS INDEX: 28.58 KG/M2 | SYSTOLIC BLOOD PRESSURE: 142 MMHG | TEMPERATURE: 97.3 F | HEART RATE: 64 BPM

## 2025-02-06 DIAGNOSIS — E78.2 MIXED HYPERLIPIDEMIA: ICD-10-CM

## 2025-02-06 DIAGNOSIS — E11.65 TYPE 2 DIABETES MELLITUS WITH HYPERGLYCEMIA, WITH LONG-TERM CURRENT USE OF INSULIN: Primary | ICD-10-CM

## 2025-02-06 DIAGNOSIS — Z79.4 TYPE 2 DIABETES MELLITUS WITH HYPERGLYCEMIA, WITH LONG-TERM CURRENT USE OF INSULIN: Primary | ICD-10-CM

## 2025-02-06 DIAGNOSIS — E66.3 OVERWEIGHT WITH BODY MASS INDEX (BMI) OF 28 TO 28.9 IN ADULT: ICD-10-CM

## 2025-02-06 PROCEDURE — 3008F BODY MASS INDEX DOCD: CPT | Performed by: NURSE PRACTITIONER

## 2025-02-06 PROCEDURE — 1036F TOBACCO NON-USER: CPT | Performed by: NURSE PRACTITIONER

## 2025-02-06 PROCEDURE — 99215 OFFICE O/P EST HI 40 MIN: CPT | Performed by: NURSE PRACTITIONER

## 2025-02-06 PROCEDURE — 2026F EYE IMG VALID EVC RTNOPTHY: CPT | Performed by: NURSE PRACTITIONER

## 2025-02-06 PROCEDURE — 3046F HEMOGLOBIN A1C LEVEL >9.0%: CPT | Performed by: NURSE PRACTITIONER

## 2025-02-06 PROCEDURE — 3077F SYST BP >= 140 MM HG: CPT | Performed by: NURSE PRACTITIONER

## 2025-02-06 PROCEDURE — 3079F DIAST BP 80-89 MM HG: CPT | Performed by: NURSE PRACTITIONER

## 2025-02-06 RX ORDER — ATORVASTATIN CALCIUM 40 MG/1
40 TABLET, FILM COATED ORAL DAILY
Qty: 30 TABLET | Refills: 11 | Status: SHIPPED | OUTPATIENT
Start: 2025-02-06 | End: 2026-02-06

## 2025-02-06 RX ORDER — BLOOD-GLUCOSE SENSOR
EACH MISCELLANEOUS
Qty: 2 EACH | Refills: 11 | Status: SHIPPED | OUTPATIENT
Start: 2025-02-06

## 2025-02-06 ASSESSMENT — ENCOUNTER SYMPTOMS
NAUSEA: 0
ACTIVITY CHANGE: 0
POLYDIPSIA: 0
DEPRESSION: 1
OCCASIONAL FEELINGS OF UNSTEADINESS: 0
POLYPHAGIA: 0
FATIGUE: 0
SLEEP DISTURBANCE: 0
DIARRHEA: 0
LOSS OF SENSATION IN FEET: 1
SEIZURES: 0
WEAKNESS: 0
APPETITE CHANGE: 0
SHORTNESS OF BREATH: 0
PALPITATIONS: 0
DIZZINESS: 0
NERVOUS/ANXIOUS: 0
NUMBNESS: 0
CONSTIPATION: 0
FREQUENCY: 0

## 2025-02-06 ASSESSMENT — PATIENT HEALTH QUESTIONNAIRE - PHQ9
SUM OF ALL RESPONSES TO PHQ9 QUESTIONS 1 AND 2: 1
2. FEELING DOWN, DEPRESSED OR HOPELESS: SEVERAL DAYS
1. LITTLE INTEREST OR PLEASURE IN DOING THINGS: NOT AT ALL
10. IF YOU CHECKED OFF ANY PROBLEMS, HOW DIFFICULT HAVE THESE PROBLEMS MADE IT FOR YOU TO DO YOUR WORK, TAKE CARE OF THINGS AT HOME, OR GET ALONG WITH OTHER PEOPLE: SOMEWHAT DIFFICULT

## 2025-02-06 NOTE — PATIENT INSTRUCTIONS
Type 2 diabetes mellitus:   RX changes:   Increase 75/25 to 12 units AM and PM  Start Dennis 3+ in office today linked to practice.   Please call [facility] and enroll in their diabetes program.  Hyperlipidemia: Started atorvastatin 40 mg today.   BMI 28: Instructed to increase whole fruits/vegestables & reduce packaged/processed food. He has lost 40 pounds with diagnosis.  Education:  blood sugar goals and complications of diabetes mellitus  Follow up: I recommend diabetes care be with pharmacist in 2 weeks and myself in July 2025 as PharmD will follow him until my follow up.

## 2025-02-06 NOTE — LETTER
To Whom It May Concern,    Mr. Leon is a patient in our practice with newly diagnosed diabetes with most recent A1C 18.1%. He should not be getting new glasses prescription until his A1C is less than 8.5%.       Please feel free to contact our office with any questions.           Lisa Finch DNP, FNP-BC.

## 2025-02-06 NOTE — PROGRESS NOTES
Diabetes Educator Note  Met with patient at the request of Lisa SANTOS for [employer] 3+ CGM training.     Patient will utilize the  to obtain data and agrees to bring the device to all appointments.  Patient was able to download and set up the fe on their phone.    Provided education on the following  Frequency of sensor changes every 15 days  Frequency of transmitter changes every 3 months (if applicable- Dexcom G6 only))  Availability of replacement sensors through [employer] [phone] at no cost to pt in events of malfunction or falling off early.   Need to keep  or cell phone within 20 feet so glucose levels are recorded  Discussed difference between serum glucose and blood glucose levels  Reviewed how to calibrate if necessary/ available (Dexcom only)  Discussed what to do if patient symptoms are different from sensor data (check fingerstick blood sugar)  Reviewed how to enter data when looking at glucose reading such as food, exercise, medication  Discussed that Dennis View does NOT alert provider to readings. Pt needs to call or send my chart message if pt needs provider to look at glucose readings  Reviewed data that is available through fe and target goals  Avg glucoses  Time in range  Sensor capture rate    Settings:  Low alarm setting 80 mg/dL  High alarm setting 300 mg/dL    Patient was able to insert sensor with verbal cueing per  instructions. Patient tolerated well.  Patient was provided time to ask questions. All questions were answered and patient verbalized understanding.      Transmitter ID: n/a   )[id number]  Sensor Lot #:  [id number]  Exp: 6/30/2025  Patient's phone is connected to [employer]    Plan:  Please continue to wear CGM and follow up for review in 2-4 weeks.  Continue with current medications.   Bring your  to all appointments (if not using your phone)  Contact [employer] [phone] for replacement device in the event of malfunction or sensor falling  off early.

## 2025-02-06 NOTE — PROGRESS NOTES
"Brett Leon is a 64 y.o. male here today for a new patient visit regarding diabetes. States he was told he had prediabetes 4 years ago, but A1C in 2020 was 6.7%    Initial diagnosis with diabetes was Jan 2025 when he presented to the ER with polydipsia, polyuria and weight loss. He was treated with insulin and fluids.     Known complications include: none    A1c 18.1%.  Previous A1c 6.7% 4 years ago.     Historical meds:  None    Current diabetes regimen is as follows:   Humalog 75/25 mix 10 units breakfast and dinner daily    Patient is using continuous glucose monitor- none  The patient is currently checking the blood glucose 2 times per day .    Hypoglycemia frequency: none  Hypoglycemia awareness: yes    Regarding symptoms of hyperglycemia, the patient is not experiencing symptoms such as polydipsia, polyuria, and nocturia.     Last foot exam: None  Last eye exam: No retinal exam.   Last urine albumin: ordered today   Last LDL: 185 2 years ago    Review of Systems   Constitutional:  Negative for activity change, appetite change and fatigue.   Respiratory:  Negative for shortness of breath.    Cardiovascular:  Negative for chest pain, palpitations and leg swelling.   Gastrointestinal:  Negative for constipation, diarrhea and nausea.   Endocrine: Negative for cold intolerance, heat intolerance, polydipsia, polyphagia and polyuria.   Genitourinary:  Negative for frequency.   Musculoskeletal:  Negative for gait problem.   Skin:  Negative for rash.   Neurological:  Negative for dizziness, seizures, weakness and numbness.   Psychiatric/Behavioral:  Negative for sleep disturbance and suicidal ideas. The patient is not nervous/anxious.         Objective    Blood pressure 142/87, pulse 64, temperature 36.3 °C (97.3 °F), temperature source Temporal, height 1.905 m (6' 3\"), weight 104 kg (229 lb 14.4 oz).  Physical Exam  Vitals and nursing note reviewed.   HENT:      Head: Atraumatic.   Pulmonary:      " Effort: Pulmonary effort is normal.   Skin:     General: Skin is warm.   Neurological:      General: No focal deficit present.      Mental Status: He is alert and oriented to person, place, and time. Mental status is at baseline.      Gait: Gait normal.   Psychiatric:         Mood and Affect: Mood normal.         Behavior: Behavior normal.         Thought Content: Thought content normal.         Judgment: Judgment normal.             Lab Results   Component Value Date    BILITOT 0.6 02/03/2025    CALCIUM 8.3 (L) 02/04/2025    CO2 28 02/04/2025     02/04/2025    CREATININE 0.96 02/04/2025    GLUCOSE 247 (H) 02/04/2025    ALKPHOS 75 02/03/2025    K 3.7 02/04/2025    PROT 6.4 02/03/2025     02/04/2025    AST 12 02/03/2025    ALT 15 02/03/2025    BUN 9 02/04/2025    ANIONGAP 8 (L) 02/04/2025    MG 2.00 02/03/2025    ALBUMIN 3.8 02/03/2025    LIPASE 16 05/27/2023     Lab Results   Component Value Date    TRIG 178 (H) 10/04/2022    CHOL 262 (H) 10/04/2022    LDLCALC 185 (H) 10/04/2022    HDL 41 10/04/2022     Lab Results   Component Value Date    HGBA1C 18.1 (H) 02/03/2025    HGBA1C 6.7 (H) 08/03/2020       The 10-year ASCVD risk score (Cosmo BYRNE, et al., 2019) is: 21.8%    Values used to calculate the score:      Age: 64 years      Sex: Male      Is Non- : Yes      Diabetic: Yes      Tobacco smoker: No      Systolic Blood Pressure: 142 mmHg      Is BP treated: No      HDL Cholesterol: 41 MG/DL      Total Cholesterol: 262 MG/DL    Assessment/Plan   Problem List Items Addressed This Visit       Hyperlipidemia    Type 2 diabetes mellitus without complication, without long-term current use of insulin (Multi) - Primary    Relevant Medications    atorvastatin (Lipitor) 40 mg tablet    blood-glucose sensor (FreeStyle Dennis 3 Plus Sensor) device     Other Visit Diagnoses       Overweight with body mass index (BMI) of 28 to 28.9 in adult              Type 2 diabetes mellitus:   RX changes:    Increase 75/25 to 12 units AM and PM  Start Dennis 3+ in office today linked to practice.   Hyperlipidemia: Started atorvastatin 40 mg today. Updated labs in 6 months  BMI 28: Instructed to increase whole fruits/vegestables & reduce packaged/processed food. He has lost 40 pounds with diagnosis.  Education:  blood sugar goals and complications of diabetes mellitus  Follow up: I recommend diabetes care be with pharmacist in 2 weeks and myself in July 2025 as PharmD will follow him until my follow up.

## 2025-02-09 LAB — GAD65 AB SER IA-ACNC: <5 IU/ML (ref 0–5)

## 2025-02-11 ENCOUNTER — TELEPHONE (OUTPATIENT)
Dept: ENDOCRINOLOGY | Facility: CLINIC | Age: 65
End: 2025-02-11
Payer: COMMERCIAL

## 2025-02-11 NOTE — TELEPHONE ENCOUNTER
CRM found in system marked as priority/urgent from 2/5 stating:    Patient received a insulin pen 10ML Lispro protamine while inpatient, he is stating the pen is not working correctly and would like medical advice regarding this concern thank you.

## 2025-02-14 ENCOUNTER — TELEPHONE (OUTPATIENT)
Dept: ENDOCRINOLOGY | Facility: CLINIC | Age: 65
End: 2025-02-14
Payer: COMMERCIAL

## 2025-02-14 NOTE — TELEPHONE ENCOUNTER
Received electronic DWO/PO/CMN form from [employer] via [employer] to complete for testing supplies supplies. Form completed on 2/14 and submitted electronically.

## 2025-02-26 LAB
ATRIAL RATE: 57 BPM
P AXIS: -18 DEGREES
P OFFSET: 202 MS
P ONSET: 139 MS
PR INTERVAL: 166 MS
Q ONSET: 222 MS
QRS COUNT: 9 BEATS
QRS DURATION: 86 MS
QT INTERVAL: 428 MS
QTC CALCULATION(BAZETT): 416 MS
QTC FREDERICIA: 420 MS
R AXIS: -8 DEGREES
T AXIS: -1 DEGREES
T OFFSET: 436 MS
VENTRICULAR RATE: 57 BPM

## 2025-02-27 ENCOUNTER — TELEPHONE (OUTPATIENT)
Dept: ENDOCRINOLOGY | Facility: CLINIC | Age: 65
End: 2025-02-27
Payer: COMMERCIAL

## 2025-02-27 NOTE — TELEPHONE ENCOUNTER
Patient called wanting to know if he should have insulin 3 times a day. States his appetite has increased and wanted his provider's advice on insulin usage.     Funmilayo Parson RN

## 2025-03-08 NOTE — PROGRESS NOTES
Patient is sent at the request of Lisa Finch, APR* for my opinion regarding diabetes.  My recommendations below will be communicated back to the requesting provider by way of shared medical record.    Recommendations:   Continue Humalog 75/25 at current dose of 12 units twice daily  Please take before breakfast and dinner  Schedule with eye doctor for an exam  Please stop at the lab when convenient  ________________________________________________________________________    Subjective   Past Medical History:  Patient Active Problem List   Diagnosis    Conjunctivitis    Dysuria    Erectile dysfunction    External hemorrhoids without complication    Hazy vision    Hemorrhoids    Hyperlipidemia    Hypertension    Lumbar disc herniation    Muscle strain    Prostate enlargement    Type 2 diabetes mellitus without complication, without long-term current use of insulin (Multi)    Urinary retention    Bladder spasm    BPH associated with nocturia    Enlarged prostate with urinary retention    Elevated PSA    Hyperglycemia     Interim:  Robby Leon is a 64 y.o. male with a PMH significant for T2DM, HTN, and HLD. Pt presents today for new patient visit with endo PharmD for Type 2 Diabetes Mellitus. Pt was recently dx w/ T2DM (2/2025) after A1C resulted at >18% and was started on Humalog 75/25 10 units BID. Last seen by Lisa Finch on 2/6/25 where Humalog 75/25 increased to 12 units BID and jericho started.    Today the patient reports that he has been doing well and denies acute concerns. States he has been compliant with insulin, but has been taking AFTER meals and not before. Additionally, pt has been keeping a close on on blood glucose w/ jericho, and this past week has been cutting down on carbs. Has not had a diabetes eye exam, states that if anything eyesight has improved since starting insulin. Denies prior h/o pancreatitis, gastroparesis, or personal/family h/o MTC or MEN2.    Diabetes Pharmacotherapy:     Humalog 75/25 - 12 units BID  Has been taking AFTER breakfast and dinner (20-45 minutes)    Previously trialed meds:   None    Social:  Current diet: Reports previously eating sweets, high CHO foods, and regular pop. This past week has been cutting down on carbs and sweets, now snacking on peanuts and drinking diet pop instead      Allergies:  Patient has no known allergies.    Medication list:  Current Outpatient Medications   Medication Instructions    atorvastatin (LIPITOR) 40 mg, oral, Daily    blood sugar diagnostic strip Use to check blood sugar 2 times a day.    blood-glucose meter misc Use to check blood sugar 2 times a day.    blood-glucose sensor (FreeStyle Dennis 3 Plus Sensor) device Use as directed to measure glucose changing every 15 days    insulin lispro protamin-lispro (HumaLOG Mix 75-25 KwikPen) 100 unit/mL (75-25) injection Inject 10 units under the skin 2 times a day before breakfast and dinner as directed.    lancets misc Use to check blood sugar 2 times a day.    omeprazole (PRILOSEC) 20 mg    tiZANidine (ZANAFLEX) 4 mg, oral, 3 times daily PRN        Objective   Last Recorded Vitals:  BP Readings from Last 3 Encounters:   02/06/25 142/87   02/04/25 154/51   06/29/24 157/79     Wt Readings from Last 3 Encounters:   02/06/25 104 kg (229 lb 14.4 oz)   02/03/25 104 kg (230 lb)   06/29/24 119 kg (263 lb)     BMI Readings from Last 1 Encounters:   02/06/25 28.74 kg/m²      Labs  A1C  Lab Results   Component Value Date    HGBA1C 18.1 (H) 02/03/2025    HGBA1C 6.7 (H) 08/03/2020     BMP/LFTs  Lab Results   Component Value Date    CREATININE 0.96 02/04/2025    CREATININE 1.05 02/03/2025    CREATININE 1.15 11/16/2023    EGFR 89 02/04/2025    EGFR 80 02/03/2025    EGFR 73 11/16/2023    GLUCOSE 247 (H) 02/04/2025     02/04/2025    K 3.7 02/04/2025     02/04/2025    CALCIUM 8.3 (L) 02/04/2025    CO2 28 02/04/2025    BUN 9 02/04/2025    ALT 15 02/03/2025    AST 12 02/03/2025    ALKPHOS 75  "02/03/2025    BILITOT 0.6 02/03/2025     Lipids  Lab Results   Component Value Date    TRIG 178 (H) 10/04/2022    CHOL 262 (H) 10/04/2022    LDLCALC 185 (H) 10/04/2022    HDL 41 10/04/2022     Urine Albumin Creatinine Ratio  No results found for: \"MICROALBCREA\"  ASCVD risk  The 10-year ASCVD risk score (Cosmo BYRNE, et al., 2019) is: 21.8%    Values used to calculate the score:      Age: 64 years      Sex: Male      Is Non- : Yes      Diabetic: Yes      Tobacco smoker: No      Systolic Blood Pressure: 142 mmHg      Is BP treated: No      HDL Cholesterol: 41 MG/DL      Total Cholesterol: 262 MG/DL    Additional labs:  Lab Results   Component Value Date    LNX95BF <5.0 02/04/2025       Home glucose monitoring:  Hypoglycemia: denies readings <70 mg/dL or s/sx of hypoglycemia  Last 2 weeks:    Last 1 week (since changing diet):        Assessment/Plan   Type 2 Diabetes Mellitus  Goal A1C <6.5%, well above goal as of 2/2025 however does not reflect initiation of insulin. TIR and GMI have improved SIGNIFICANTLY since starting insulin, and is now near goal w/o hypoglycemia. Discussed excellent improvement with patient who was excited to hear he has been doing better. Patient reported interest in a GLP1, wife is on Ozempic, however discussed risk of retinopathy given dramatic change in A1C in short period of time. Endo did retinal scan at last visit but scan was inconclusive. Pt agreeable to continue regimen for now for a couple months and get scheduled with ophthy, then see PharmD again where GLP1 could be discussed. Retinopathy unknown, but pt denies prior h/o pancreatitis, gastroparesis, or personal/family h/o MTC or MEN2.   Plan:  Continue Humalog 75/25 at current dose of 12 units twice daily  Please take before breakfast and dinner  Schedule with eye doctor for an exam  Please stop at the lab when convenient  Home glucose monitoring:   Will continue FSL3+  A minimum of 72 hours of CGM data was " reviewed and used to make therapy changes.   Education Provided to Patient:   Glycemic goals  Risk of retinopathy  Potential future therapeutic options   Rule of 15  Primary prevention:   Therapy: High intensity statin   LDL result does not yet meet goal (2022)  Endo ordered repeat labs  Renal:  CKD: stage 2 - GFR 60-89  ACR: Unavailable   Renal protective agents: none  DM medications are dosed appropriately for renal function  Labs: Endo ordered lipids, ACR, and c-peptide, reminded pt today  PharmD follow-up: 2 months  Endo follow-up: 9/11/25    Patient agreeable to plan as above, contact information provided for any future questions or concerns.    Andrea Red, PharmD    Type of encounter: in person  Provider on site: Lisa Finch    Continue all meds under the continuation of care with the referring provider and clinical pharmacy team.

## 2025-03-10 ENCOUNTER — APPOINTMENT (OUTPATIENT)
Dept: ENDOCRINOLOGY | Facility: CLINIC | Age: 65
End: 2025-03-10
Payer: COMMERCIAL

## 2025-03-10 DIAGNOSIS — E11.65 TYPE 2 DIABETES MELLITUS WITH HYPERGLYCEMIA, WITH LONG-TERM CURRENT USE OF INSULIN: ICD-10-CM

## 2025-03-10 DIAGNOSIS — Z79.4 TYPE 2 DIABETES MELLITUS WITH HYPERGLYCEMIA, WITH LONG-TERM CURRENT USE OF INSULIN: ICD-10-CM

## 2025-03-10 DIAGNOSIS — E11.9 TYPE 2 DIABETES MELLITUS WITHOUT COMPLICATION, WITHOUT LONG-TERM CURRENT USE OF INSULIN (MULTI): ICD-10-CM

## 2025-03-10 DIAGNOSIS — Z79.4 TYPE 2 DIABETES MELLITUS WITH HYPERGLYCEMIA, WITH LONG-TERM CURRENT USE OF INSULIN: Primary | ICD-10-CM

## 2025-03-10 DIAGNOSIS — E11.65 TYPE 2 DIABETES MELLITUS WITH HYPERGLYCEMIA, WITH LONG-TERM CURRENT USE OF INSULIN: Primary | ICD-10-CM

## 2025-03-10 PROCEDURE — 99211 OFF/OP EST MAY X REQ PHY/QHP: CPT

## 2025-03-10 PROCEDURE — 95251 CONT GLUC MNTR ANALYSIS I&R: CPT

## 2025-03-10 RX ORDER — INSULIN LISPRO 100 [IU]/ML
INJECTION, SUSPENSION SUBCUTANEOUS
Qty: 45 ML | Refills: 11 | Status: SHIPPED | OUTPATIENT
Start: 2025-03-10 | End: 2026-03-10

## 2025-03-10 NOTE — PATIENT INSTRUCTIONS
Continue Humalog 75/25 at current dose of 12 units twice daily  Please take before breakfast and dinner  Schedule with eye doctor for an exam  Please stop at the lab when convenient  If you have any questions or concerns, call me at: [phone]

## 2025-03-25 ENCOUNTER — TELEPHONE (OUTPATIENT)
Dept: ENDOCRINOLOGY | Facility: CLINIC | Age: 65
End: 2025-03-25
Payer: COMMERCIAL

## 2025-03-25 NOTE — TELEPHONE ENCOUNTER
"The patient is here in the office and states \"I spoke with a lady yesterday she told me I could have 2 free Dennis samples and to come to the office to pick them up.\"   "

## 2025-03-25 NOTE — TELEPHONE ENCOUNTER
"The patient states \"I spoke to the lady here yesterday and let her know my Dennis tore off when I was getting out of my car, she told me to come in and get two free samples from the office.\"     I instructed the patient when the Dennis sensor comes off or malfunctions please call [employer] directly at [phone] to receive a replacement directly from the company.\"     I applied a Dennis sensor to the patients right arm while in the office today.     The patient is requesting \"can you find out where the supplies are for the Dennis.\"        "
"This nurse called the patient at [phone] to confirm the pick-up of the Dennis 3 Sensor prescription at [facility]. The patient verbalized understanding by stating \"I will go  my Dennis prescription today so I have it.\"   "
"This nurse called the pharmacy at      [facility] - [place], [place] - [address]  [address] [place] [address]  Phone: [phone]  Fax: [phone]     I spoke with Yeny at the pharmacy who states \"we have the prescription, we are filling it now and it will be ready for pick-up sometime this morning.\"   "
"To clarify the patients pharmacy was correct in the chart this nurse asked the patient \"where do you get your prescriptions filled at?\" The patient stated \"[facility] in [place].\" I advised the patient \"the chart shows you have a Dennis 3 prescription at this pharmacy have you picked it up?\" The patient stated \"No, the pharmacy told me they did not have any and sent my prescription to another [facility] on [address].\" I provided the patient with his current pharmacy on file phone number [phone] to call them to find his Dennis 3 prescription and the number to [facility] [phone] to get a replacement sensor when needed. I advised the patient to reach out to the pharmacy as I was not able to get anyone on the line because they do not open until 9:00am. The patient verbalized understanding of the instructions provided by stating \"I will call [facility] or go there to find my Dennis prescription.\"  "
This nurse called the patients pharmacy on file -   [id number] - [place], [place] - [address]  [address] [place] [address]  Phone: [phone]  Fax: [phone]     to locate the patients Dennis 3 Plus prescription.      The pharmacy does not open until 9:00AM I was not able to speak to anyone in the pharmacy at this time.   
none

## 2025-03-31 ENCOUNTER — APPOINTMENT (OUTPATIENT)
Dept: OPHTHALMOLOGY | Facility: CLINIC | Age: 65
End: 2025-03-31
Payer: COMMERCIAL

## 2025-04-28 ENCOUNTER — APPOINTMENT (OUTPATIENT)
Dept: OPHTHALMOLOGY | Facility: CLINIC | Age: 65
End: 2025-04-28
Payer: COMMERCIAL

## 2025-05-07 NOTE — PROGRESS NOTES
Patient is sent at the request of Lisa Finch, APR* for my opinion regarding diabetes.  My recommendations below will be communicated back to the requesting provider by way of shared medical record.    Recommendations:   Continue current medications for now  Please rescheduled your eye appointment and go to the lab before our next appointment  ________________________________________________________________________    Subjective   Past Medical History:  Problem List[1]    HPI:  Robby Leon is a 64 y.o. male with a PMH significant for T2DM, HTN, and HLD  Pt presents today for follow up visit with endo PharmD for Type 2 Diabetes Mellitus  Last seen by myself (Andrea Red) on 3/10/25 where nod dose changes were made, pt but was instructed to take humalog 75/25 BEFORE meals    Today:   Pt reports he has not been able to see ophthalmology since last appt, had had to cancel/move a couple appts  States if anything his vision has actually improved in the past few months  Reports compliance with insulin before meals  Denies acute concerns    Diabetes Pharmacotherapy:    Humalog 75/25 - 12 units before breakfast and dinner  Takes before first bite     Previously trialed meds:   None      Allergies:  Patient has no known allergies.    Medication list:  Current Outpatient Medications   Medication Instructions    atorvastatin (LIPITOR) 40 mg, oral, Daily    blood-glucose meter misc Use to check blood sugar 2 times a day.    blood-glucose sensor (FreeStyle Dennis 3 Plus Sensor) device Use as directed to measure glucose changing every 15 days    insulin lispro protamin-lispro (HumaLOG Mix 75-25 KwikPen) 100 unit/mL (75-25) pen Inject 12 units under the skin 2 times a day before breakfast and dinner as directed.    lancets misc Use to check blood sugar 2 times a day.    omeprazole (PRILOSEC) 20 mg    tiZANidine (ZANAFLEX) 4 mg, oral, 3 times daily PRN        Objective   Last Recorded Vitals:  BP Readings from Last 3  "Encounters:   02/06/25 142/87   02/04/25 154/51   06/29/24 157/79     Wt Readings from Last 3 Encounters:   02/06/25 104 kg (229 lb 14.4 oz)   02/03/25 104 kg (230 lb)   06/29/24 119 kg (263 lb)     BMI Readings from Last 1 Encounters:   02/06/25 28.74 kg/m²      Labs  A1C  Lab Results   Component Value Date    HGBA1C 18.1 (H) 02/03/2025    HGBA1C 6.7 (H) 08/03/2020     BMP/LFTs  Lab Results   Component Value Date    CREATININE 0.96 02/04/2025    CREATININE 1.05 02/03/2025    CREATININE 1.15 11/16/2023    EGFR 89 02/04/2025    EGFR 80 02/03/2025    EGFR 73 11/16/2023    GLUCOSE 247 (H) 02/04/2025     02/04/2025    K 3.7 02/04/2025     02/04/2025    CALCIUM 8.3 (L) 02/04/2025    CO2 28 02/04/2025    BUN 9 02/04/2025    ALT 15 02/03/2025    AST 12 02/03/2025    ALKPHOS 75 02/03/2025    BILITOT 0.6 02/03/2025     Lipids  Lab Results   Component Value Date    TRIG 178 (H) 10/04/2022    CHOL 262 (H) 10/04/2022    LDLCALC 185 (H) 10/04/2022    HDL 41 10/04/2022     Urine Albumin Creatinine Ratio  No results found for: \"MICROALBCREA\"  ASCVD risk  The 10-year ASCVD risk score (Cosmo BYRNE, et al., 2019) is: 21.8%    Values used to calculate the score:      Age: 64 years      Sex: Male      Is Non- : Yes      Diabetic: Yes      Tobacco smoker: No      Systolic Blood Pressure: 142 mmHg      Is BP treated: No      HDL Cholesterol: 41 MG/DL      Total Cholesterol: 262 MG/DL    Additional labs:  Lab Results   Component Value Date    ILY05PD <5.0 02/04/2025       Home glucose monitoring:  Hypoglycemia: denies readings <70 mg/dL or s/sx of hypoglycemia, one false low noted        Assessment/Plan   Type 2 Diabetes Mellitus  Goal A1C <6.5%, well above goal as of 2/2025 however does not reflect initiation of insulin. TIR and GMI have improved SIGNIFICANTLY since starting insulin, and is now at goal w/o hypoglycemia. Discussed excellent improvement with patient who was excited to hear he has been " doing well. Patient previously reported interest in Ozempic (wife is on it) to cut back on insulin, however this was deferred given dramatic shift in A1C by over 10% and inconclusive retinal scan at endo visit. Pt has been unable to complete eye exam, so will defer starting GLP1 at this time. Of note, retinopathy unknown, but pt denies prior h/o pancreatitis, gastroparesis, or personal/family h/o MTC or MEN2. Could consider screening for SGLT2i as well, given lack of compelling indication will defer until c-peptide returns to ensure DKA risk is low. No changes today until pt completes labs and eye exam as previously recommended.  Plan:  Continue current medications  Please stop at the lab when convenient and reschedule eye exam  Home glucose monitoring:   Will continue FSL3+  A minimum of 72 hours of CGM data was reviewed and used to make therapy changes.   Education Provided to Patient:   Glycemic goals  Risk of retinopathy  Potential future therapeutic options   Rule of 15  Primary prevention:   Therapy: High intensity statin   LDL result does not yet meet goal (2022)  Endo ordered repeat labs  Renal:  CKD: stage 2 - GFR 60-89  ACR: Unavailable   Renal protective agents: none  DM medications are dosed appropriately for renal function  Labs: Endo ordered lipids, ACR, and c-peptide, reminded pt today and added A1C  PharmD follow-up: 2 months  Endo follow-up: 9/11/25    Patient agreeable to plan as above, contact information provided for any future questions or concerns.    Andrea Red, PharmD    Type of encounter: virtual    Continue all meds under the continuation of care with the referring provider and clinical pharmacy team.           [1]   Patient Active Problem List  Diagnosis    Conjunctivitis    Dysuria    Erectile dysfunction    External hemorrhoids without complication    Hazy vision    Hemorrhoids    Hyperlipidemia    Hypertension    Lumbar disc herniation    Muscle strain    Prostate enlargement     Type 2 diabetes mellitus without complication, without long-term current use of insulin    Urinary retention    Bladder spasm    BPH associated with nocturia    Enlarged prostate with urinary retention    Elevated PSA    Hyperglycemia

## 2025-05-08 ENCOUNTER — APPOINTMENT (OUTPATIENT)
Dept: PHARMACY | Facility: HOSPITAL | Age: 65
End: 2025-05-08
Payer: COMMERCIAL

## 2025-05-08 DIAGNOSIS — E11.9 TYPE 2 DIABETES MELLITUS WITHOUT COMPLICATION, WITHOUT LONG-TERM CURRENT USE OF INSULIN: Primary | ICD-10-CM

## 2025-05-08 NOTE — PATIENT INSTRUCTIONS
Continue current medications for now  Please rescheduled your eye appointment and go to the lab before our next appointment  If you have any questions or concerns, call me at: [phone]

## 2025-07-06 NOTE — PROGRESS NOTES
Patient is sent at the request of Lisa Finch, APR* for my opinion regarding diabetes.  My recommendations below will be communicated back to the requesting provider by way of shared medical record.    Recommendations:   [employer] application submitted  Start Ozempic 0.25mg once weekly for 4 weeks, then increase to 0.5mg once weekly  Stop Humalog 75/25  Start Tresiba 18 units once daily, however decrease to 15 units daily when increasing Ozempic or if low blood sugars occur  Continue all other medications  ________________________________________________________________________    Subjective   Past Medical History:  Problem List[1]      HPI:  Robby Leon is a 64 y.o. male with a PMH significant for T2DM, HTN, and HLD  Pt presents today for follow up visit with endo PharmD for Type 2 Diabetes Mellitus  Last seen by myself (Andrea Red) on 5/8 where no changes were made    Today:   Scope limited because the patient was at the grocery store during visit  Since last visit has lost his job d/t falling asleep at work  Has lost insurance  Denied from medicaid  Is not on wife's insurance plan  Has not looked at marketplace coverage yet  Scheduled to see eye doctor on 7/8 but has to cancel given lack of insurance  Pt is wearing last dennis sensor    Diabetes Pharmacotherapy:    Humalog 75/25 - 12 units before breakfast and dinner  Takes before first bite  Has one pen left     Previously trialed meds:   None      Allergies:  Patient has no known allergies.    Medication list:  Current Outpatient Medications   Medication Instructions    atorvastatin (LIPITOR) 40 mg, oral, Daily    blood-glucose meter misc Use to check blood sugar 2 times a day.    blood-glucose sensor (FreeStyle Dennis 3 Plus Sensor) device Use as directed to measure glucose changing every 15 days    insulin lispro protamin-lispro (HumaLOG Mix 75-25 KwikPen) 100 unit/mL (75-25) pen Inject 12 units under the skin 2 times a day before breakfast  "and dinner as directed.    lancets misc Use to check blood sugar 2 times a day.    omeprazole (PRILOSEC) 20 mg    tiZANidine (ZANAFLEX) 4 mg, oral, 3 times daily PRN        Objective   Last Recorded Vitals:  BP Readings from Last 3 Encounters:   02/06/25 142/87   02/04/25 154/51   06/29/24 157/79     Wt Readings from Last 3 Encounters:   02/06/25 104 kg (229 lb 14.4 oz)   02/03/25 104 kg (230 lb)   06/29/24 119 kg (263 lb)     BMI Readings from Last 1 Encounters:   02/06/25 28.74 kg/m²      Labs  A1C  Lab Results   Component Value Date    HGBA1C 18.1 (H) 02/03/2025    HGBA1C 6.7 (H) 08/03/2020     BMP/LFTs  Lab Results   Component Value Date    CREATININE 0.96 02/04/2025    CREATININE 1.05 02/03/2025    CREATININE 1.15 11/16/2023    EGFR 89 02/04/2025    EGFR 80 02/03/2025    EGFR 73 11/16/2023    GLUCOSE 247 (H) 02/04/2025     02/04/2025    K 3.7 02/04/2025     02/04/2025    CALCIUM 8.3 (L) 02/04/2025    CO2 28 02/04/2025    BUN 9 02/04/2025    ALT 15 02/03/2025    AST 12 02/03/2025    ALKPHOS 75 02/03/2025    BILITOT 0.6 02/03/2025     Lipids  Lab Results   Component Value Date    TRIG 178 (H) 10/04/2022    CHOL 262 (H) 10/04/2022    LDLCALC 185 (H) 10/04/2022    HDL 41 10/04/2022     Urine Albumin Creatinine Ratio  No results found for: \"MICROALBCREA\"    ASCVD risk  The 10-year ASCVD risk score (Cosmo BYRNE, et al., 2019) is: 21.8%    Values used to calculate the score:      Age: 64 years      Sex: Male      Is Non- : Yes      Diabetic: Yes      Tobacco smoker: No      Systolic Blood Pressure: 142 mmHg      Is BP treated: No      HDL Cholesterol: 41 MG/DL      Total Cholesterol: 262 MG/DL    Additional labs:  Lab Results   Component Value Date    ERE98GQ <5.0 02/04/2025       Home glucose monitoring:  Hypoglycemia: denies readings <70 mg/dL or s/sx of hypoglycemia        Assessment/Plan   Type 2 Diabetes Mellitus  Goal A1C <6.5%, well above goal as of 2/2025 however does not " reflect initiation of insulin. TIR and GMI have improved significantly since starting insulin, and has continued to be at goal w/o hypoglycemia. Unfortunately, pt lost his insurance and has not been able to complete lab wor or see the eye doctor since last appt. He has not ran out of supplies but is on his last CGM sensor and insulin pen. Discussed options, including PAP. Also discussed GLP1 therapy. Of note, retinopathy unknown, but pt denies prior h/o pancreatitis, gastroparesis, or personal/family h/o MTC or MEN2. Discussed risk of retinopathy, however given risk/benefit pt agreeable to start Ozempic. Will also switch from 75/25 to once daily basal when starting Ozempic, applying for [employer]. Could consider screening for SGLT2i as well, given lack of compelling indication will defer for now but could consider in the future.   Plan:  Nov[employer] application submitted  Start Ozempic 0.25mg once weekly for 4 weeks, then increase to 0.5mg once weekly  --> Sample given today  Stop Humalog 75/25  Start Tresiba 18 units once daily, however decrease to 15 units daily when increasing Ozempic or if low blood sugars occur   --> sample of [place]jeo given today  Continue all other medications  Home glucose monitoring:   Will unfortunately go back to once daily fingersticks given lack of insurance, did discuss jericho perez  A minimum of 72 hours of CGM data was reviewed and used to make therapy changes.   Education Provided to Patient:   Glycemic goals  Risk of retinopathy  Potential future therapeutic options   Rule of 15  Primary prevention:   Therapy: High intensity statin   LDL result does not yet meet goal (2022)  Endo previously ordered repeat labs  Renal:  CKD: stage 2 - GFR 60-89  ACR: Unavailable   Renal protective agents: none  DM medications are dosed appropriately for renal function  Labs: previously ordered A1C, lipids, ACR, and c-peptide; has not completed d/t lack of insurance  PharmD follow-up: 6 weeks  Endo  follow-up: 9/11/25    Patient agreeable to plan as above, contact information provided for any future questions or concerns.    Andrea Red PharmD    Type of encounter: virtual    Virtual or Telephone Consent    While technically available, the patient was unable or unwilling to consent to connect via audio/video telehealth technology; therefore, I performed this visit using a real-time audio only connection between Robby Leon & Andrea Red PharmD.  Verbal consent was requested and obtained from Robby Leon on this date, 07/09/25 for a telehealth visit and the patient's location was confirmed at the time of the visit.      Continue all meds under the continuation of care with the referring provider and clinical pharmacy team.           [1]   Patient Active Problem List  Diagnosis    Conjunctivitis    Dysuria    Erectile dysfunction    External hemorrhoids without complication    Hazy vision    Hemorrhoids    Hyperlipidemia    Hypertension    Lumbar disc herniation    Muscle strain    Prostate enlargement    Type 2 diabetes mellitus without complication, without long-term current use of insulin    Urinary retention    Bladder spasm    BPH associated with nocturia    Enlarged prostate with urinary retention    Elevated PSA    Hyperglycemia

## 2025-07-08 ENCOUNTER — APPOINTMENT (OUTPATIENT)
Dept: PHARMACY | Facility: HOSPITAL | Age: 65
End: 2025-07-08
Payer: COMMERCIAL

## 2025-07-08 DIAGNOSIS — E11.9 TYPE 2 DIABETES MELLITUS WITHOUT COMPLICATION, WITHOUT LONG-TERM CURRENT USE OF INSULIN: Primary | ICD-10-CM

## 2025-07-08 RX ORDER — IBUPROFEN 200 MG
1 CAPSULE ORAL DAILY
Qty: 100 EACH | Refills: 3 | Status: SHIPPED | OUTPATIENT
Start: 2025-07-08 | End: 2025-07-09 | Stop reason: SDUPTHER

## 2025-07-08 NOTE — Clinical Note
I am not done with my note yet but wanted to reach out for an opinion. This patient recently lost his job and insurance, so I was going to sign him up for [employer], however because of these events he has not been able to see the eye doctor. His retinal scan from before was indeterminant, and he had a huge improvement in blood sugars since his last A1C a few months ago. In an ideal world we would switch off of mixed insulin to go to basal + GLP1 then eventually just GLP1 but there is the retinopathy risk. What would your opinion be in this case? He is coming in tomorrow to fill out his Novoc[employer] application, I was leaning more towards the risk outweighs the benefit given the major change in BG however his Bgs have been stable for the past couple months so that lowers the risk at least

## 2025-07-09 ENCOUNTER — APPOINTMENT (OUTPATIENT)
Dept: OPHTHALMOLOGY | Facility: CLINIC | Age: 65
End: 2025-07-09
Payer: COMMERCIAL

## 2025-07-09 RX ORDER — IBUPROFEN 200 MG
1 CAPSULE ORAL DAILY
Qty: 100 EACH | Refills: 3 | Status: SHIPPED | OUTPATIENT
Start: 2025-07-09

## 2025-07-09 RX ORDER — DEXTROSE 4 G
TABLET,CHEWABLE ORAL
Qty: 1 EACH | Refills: 0 | Status: SHIPPED | OUTPATIENT
Start: 2025-07-09 | End: 2026-07-09

## 2025-07-09 NOTE — PATIENT INSTRUCTIONS
Start Ozempic 0.25mg once weekly for 4 weeks, then increase to 0.5mg once weekly  Stop Humalog 75/25  Start Tresiba 18 units once daily, however decrease to 15 units daily when increasing Ozempic or if low blood sugars occur  Continue all other medications  If you have any questions or concerns, call me at: [phone]

## 2025-08-15 ENCOUNTER — TELEPHONE (OUTPATIENT)
Dept: PHARMACY | Facility: HOSPITAL | Age: 65
End: 2025-08-15
Payer: COMMERCIAL

## 2025-08-20 ENCOUNTER — TELEPHONE (OUTPATIENT)
Dept: ENDOCRINOLOGY | Facility: CLINIC | Age: 65
End: 2025-08-20
Payer: COMMERCIAL

## 2025-08-28 ENCOUNTER — APPOINTMENT (OUTPATIENT)
Dept: PHARMACY | Facility: HOSPITAL | Age: 65
End: 2025-08-28
Payer: COMMERCIAL

## 2025-08-28 DIAGNOSIS — E11.9 TYPE 2 DIABETES MELLITUS WITHOUT COMPLICATION, WITHOUT LONG-TERM CURRENT USE OF INSULIN: ICD-10-CM

## 2025-08-28 RX ORDER — ACETAMINOPHEN 500 MG
TABLET ORAL
Qty: 1 EACH | Refills: 0 | Status: SHIPPED | OUTPATIENT
Start: 2025-08-28

## 2025-09-11 ENCOUNTER — APPOINTMENT (OUTPATIENT)
Dept: ENDOCRINOLOGY | Facility: CLINIC | Age: 65
End: 2025-09-11
Payer: COMMERCIAL

## (undated) DEVICE — COLLECTION BAG, FLUID, NON-STERILE

## (undated) DEVICE — CATHETER, SECURE DEVICE, URINARY, ADH

## (undated) DEVICE — CATHETER, FOLEY, 3WAY, 22FR, 30CC, LUBRI-SIL, LF

## (undated) DEVICE — BLADES, PIRANHA, STORZ

## (undated) DEVICE — SYRINGE, 50 CC, LUER LOCK

## (undated) DEVICE — DRESSING, GAUZE, PETROLATUM, VASELINE, 3 X 36 IN, STERILE

## (undated) DEVICE — Device

## (undated) DEVICE — SYRINGE, 60 CC, IRRIGATION, PISTON, CATH TIP, W/LUER ADAPTER,DISP

## (undated) DEVICE — EVACUATOR, BLADDER, ELLIK, PLASTIC

## (undated) DEVICE — TUBING, CLEAR N-COND, 5MM X 10, LF

## (undated) DEVICE — CATHETER, IRRIGATION, CURITY, 22FR

## (undated) DEVICE — TUBING SET, PIRANHA

## (undated) DEVICE — ADAPTER, Y TUBING STERILE

## (undated) DEVICE — BAG, DRAINAGE, ANTI-REFLUX CHAMBER, 2000ML

## (undated) DEVICE — COVER, MAYO STAND, W/PAD, 23 IN, DISPOSABLE, PLASTIC, LF, STERILE

## (undated) DEVICE — PLUG, CATHETER

## (undated) DEVICE — LUBRICANT, WATER SOLUBLE, BACTERIOSTATIC, 2 OZ, STERILE

## (undated) DEVICE — BLADE, ROTATION MORCELLATOR, 4.8MM X 385MM, PIRHANA, DISPOSABLE

## (undated) DEVICE — IRRIGATION SET, CYSTOSCOPY, TURP, Y, CONTINUOUS, 81 IN

## (undated) DEVICE — TUBING, MORCELLATOR PUMP, DISPOSABLE

## (undated) DEVICE — SLEEVE, VASO PRESS, CALF GARMENT, MEDIUM, GREEN

## (undated) DEVICE — CONTAINER, PIRANHA, TISSUE

## (undated) DEVICE — KIT, PIRANHA,  OVERFLOW PROTECTOR/BACTERIA FILTER